# Patient Record
Sex: MALE | Employment: FULL TIME | URBAN - METROPOLITAN AREA
[De-identification: names, ages, dates, MRNs, and addresses within clinical notes are randomized per-mention and may not be internally consistent; named-entity substitution may affect disease eponyms.]

---

## 2017-02-02 ENCOUNTER — OFFICE VISIT (OUTPATIENT)
Dept: FAMILY MEDICINE | Facility: CLINIC | Age: 32
End: 2017-02-02

## 2017-02-02 VITALS
OXYGEN SATURATION: 100 % | DIASTOLIC BLOOD PRESSURE: 83 MMHG | RESPIRATION RATE: 18 BRPM | SYSTOLIC BLOOD PRESSURE: 122 MMHG | HEIGHT: 66 IN | WEIGHT: 169.6 LBS | TEMPERATURE: 97.5 F | BODY MASS INDEX: 27.26 KG/M2 | HEART RATE: 72 BPM

## 2017-02-02 DIAGNOSIS — Z00.00 ROUTINE GENERAL MEDICAL EXAMINATION AT A HEALTH CARE FACILITY: Primary | ICD-10-CM

## 2017-02-02 DIAGNOSIS — B35.0 TINEA CAPITIS: ICD-10-CM

## 2017-02-02 LAB
ALBUMIN SERPL-MCNC: 4.2 MG/DL (ref 3.8–5)
ALP SERPL-CCNC: 102.3 U/L (ref 31.7–110.7)
ALT SERPL-CCNC: 18.4 U/L (ref 0–45)
AST SERPL-CCNC: 17.1 U/L (ref 0–55)
BILIRUB SERPL-MCNC: 0.4 MG/DL (ref 0.2–1.3)
BILIRUBIN DIRECT: 0.2 MG/DL (ref 0.1–0.3)
PROT SERPL-MCNC: 7.5 G/DL (ref 6.8–8.8)

## 2017-02-02 RX ORDER — GRISEOFULVIN 250 MG/1
500 TABLET ORAL DAILY
Qty: 30 TABLET | Refills: 0 | Status: SHIPPED | OUTPATIENT
Start: 2017-02-02 | End: 2017-03-20
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
## 2017-02-02 NOTE — MR AVS SNAPSHOT
After Visit Summary   2/2/2017    Abhay Lopez    MRN: [id number]           Patient Information     Date Of Birth          1985        Visit Information        Provider Department      2/2/2017 10:40 AM Julius Mo DO [facility]        Today's Diagnoses     Routine general medical examination at a health care facility    -  1     Tinea capitis           Care Instructions      Preventive Health Recommendations  Male Ages 26 - 39    Yearly exam:             See your health care provider every year in order to  o   Review health changes.   o   Discuss preventive care.    o   Review your medicines if your doctor has prescribed any.    You should be tested each year for STDs (sexually transmitted diseases), if you re at risk.     After age 35, talk to your provider about cholesterol testing. If you are at risk for heart disease, have your cholesterol tested at least every 5 years.     If you are at risk for diabetes, you should have a diabetes test (fasting glucose).  Shots: Get a flu shot each year. Get a tetanus shot every 10 years.     Nutrition:    Eat at least 5 servings of fruits and vegetables daily.     Eat whole-grain bread, whole-wheat pasta and brown rice instead of white grains and rice.     Talk to your provider about Calcium and Vitamin D.     Lifestyle    Exercise for at least 150 minutes a week (30 minutes a day, 5 days a week). This will help you control your weight and prevent disease.     Limit alcohol to one drink per day.     No smoking.     Wear sunscreen to prevent skin cancer.     See your dentist every six months for an exam and cleaning.             Follow-ups after your visit        Who to contact     Please call your clinic at [phone] to:    Ask questions about your health    Make or cancel appointments    Discuss your medicines    Learn about your test results    Speak to your doctor   If you have compliments or concerns about an experience at  "your clinic, or if you wish to file a complaint, please contact [facility] Patient Relations at [phone] or email us at [email or web address]         Additional Information About Your Visit        AuthorBeesusan Information     [employer] is an electronic gateway that provides easy, online access to your medical records. With [employer], you can request a clinic appointment, read your test results, renew a prescription or communicate with your care team.     To sign up for [employer] visit the website at [email or web address]   You will be asked to enter the access code listed below, as well as some personal information. Please follow the directions to create your username and password.     Your access code is: [email or web address]  Expires: 5/3/2017 11:54 AM     Your access code will  in 90 days. If you need help or a new code, please contact your [facility] or call [phone] for assistance.        Care EveryWhere ID     This is your Care EveryWhere ID. This could be used by other organizations to access your [facility] medical records  [id number]        Your Vitals Were     Pulse Temperature Respirations Height BMI (Body Mass Index) Pulse Oximetry    72 97.5  F (36.4  C) (Oral) 18 5' 6.25\" (168.3 cm) 27.16 kg/m2 100%       Blood Pressure from Last 3 Encounters:   17 122/83   01/28/15 116/75   14 111/73    Weight from Last 3 Encounters:   17 169 lb 9.6 oz (76.93 kg)   01/28/15 164 lb 9.6 oz (74.662 kg)   14 163 lb 12.8 oz (74.299 kg)              We Performed the Following     Anti Treponema     Hepatic Panel ([facility])     HIV Antigen Antibody Combo          Today's Medication Changes          These changes are accurate as of: 17 11:54 AM.  If you have any questions, ask your nurse or doctor.               Start taking these medicines.        Dose/Directions    griseofulvin microsize 500 MG Tabs tablet   Commonly known as:  " GRIFULVIN V   Used for:  Tinea capitis   Started by:  Julius Mo DO        Dose:  500 mg   Take 1 tablet (500 mg) by mouth daily   Quantity:  30 tablet   Refills:  0            Where to get your medicines      These medications were sent to [facility] [place], [place] - [address] [facility] [address]     Phone:  [phone]    - griseofulvin microsize 500 MG Tabs tablet             Primary Care Provider Office Phone # Fax #    Adelina Villalpando [phone]       [facility] [address]   [facility] [address]        Thank you!     Thank you for choosing [facility]  for your care. Our goal is always to provide you with excellent care. Hearing back from our patients is one way we can continue to improve our services. Please take a few minutes to complete the written survey that you may receive in the mail after your visit with us. Thank you!             Your Updated Medication List - Protect others around you: Learn how to safely use, store and throw away your medicines at www.disposemymeds.org.          This list is accurate as of: 2/2/17 11:54 AM.  Always use your most recent med list.                   Brand Name Dispense Instructions for use    * cyclobenzaprine 10 MG tablet    FLEXERIL    30 tablet    Take 1 tablet (10 mg) by mouth At Bedtime       * cyclobenzaprine 5 MG tablet    FLEXERIL    42 tablet    Take 1 tablet (5 mg) by mouth 3 times daily as needed for muscle spasms       GABAPENTIN ([facility]) PO          griseofulvin microsize 500 MG Tabs tablet    GRIFULVIN V    30 tablet    Take 1 tablet (500 mg) by mouth daily       ibuprofen 600 MG tablet    ADVIL/MOTRIN    90 tablet    Take 1 tablet (600 mg) by mouth every 6 hours as needed for moderate pain       naproxen 500 MG tablet    NAPROSYN    60 tablet    Take 1 tablet (500 mg) by mouth 2 times daily as needed for moderate pain       * Notice:  This list has 2  medication(s) that are the same as other medications prescribed for you. Read the directions carefully, and ask your doctor or other care provider to review them with you.

## 2017-02-03 LAB
HIV 1+2 AB+HIV1 P24 AG SERPL QL IA: NORMAL
T PALLIDUM IGG+IGM SER QL: NEGATIVE

## 2017-02-06 NOTE — PROGRESS NOTES
Preceptor Attestation:   Patient seen and discussed with the resident. Assessment and plan reviewed with resident and agreed upon.   Supervising Physician:  Abraham Montes MD MD  [facility]

## 2017-02-15 ENCOUNTER — OFFICE VISIT (OUTPATIENT)
Dept: FAMILY MEDICINE | Facility: CLINIC | Age: 32
End: 2017-02-15

## 2017-02-15 VITALS
TEMPERATURE: 97.3 F | SYSTOLIC BLOOD PRESSURE: 126 MMHG | HEART RATE: 64 BPM | WEIGHT: 168.4 LBS | BODY MASS INDEX: 27.06 KG/M2 | HEIGHT: 66 IN | DIASTOLIC BLOOD PRESSURE: 88 MMHG

## 2017-02-15 DIAGNOSIS — S29.019A STRAIN OF MUSCLE OF TORSO, INITIAL ENCOUNTER: Primary | ICD-10-CM

## 2017-02-15 DIAGNOSIS — M51.369 L4-L5 DISC BULGE: ICD-10-CM
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
## 2017-02-15 PROBLEM — M54.41 RIGHT-SIDED LOW BACK PAIN WITH RIGHT-SIDED SCIATICA: Status: ACTIVE | Noted: 2017-02-15

## 2017-02-15 LAB
BILIRUBIN UR: ABNORMAL
BLOOD UR: NEGATIVE
GLUCOSE URINE: NEGATIVE
KETONES UR QL: ABNORMAL
LEUKOCYTE ESTERASE UR: NEGATIVE
NITRITE UR QL STRIP: NEGATIVE
PH UR STRIP: 5.5 [PH] (ref 5–7)
PROTEIN UR: NEGATIVE
SP GR UR STRIP: 1.02
UROBILINOGEN UR STRIP-ACNC: ABNORMAL

## 2017-02-15 RX ORDER — NAPROXEN 500 MG/1
500 TABLET ORAL 2 TIMES DAILY
Qty: 10 TABLET | Refills: 0 | Status: SHIPPED
Start: 2017-02-15 | End: 2017-02-20
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
## 2017-02-15 NOTE — NURSING NOTE
I have recommended that this patient have a flu shot but he declines at this time.Ana Riddle, CMA

## 2017-02-15 NOTE — MR AVS SNAPSHOT
After Visit Summary   2/15/2017    Abhay Lopez    MRN: [id number]           Patient Information     Date Of Birth          1985        Visit Information        Provider Department      2/15/2017 10:00 AM La Villalpando MD [facility]        Today's Diagnoses     Low back pain with sciatica, sciatica laterality unspecified, unspecified back pain laterality, unspecified chronicity    -  1    Right-sided low back pain with right-sided sciatica, unspecified chronicity          Care Instructions    Here is the plan from today's visit    1. Low back pain with sciatica, sciatica laterality unspecified, unspecified back pain laterality, unspecified chronicity  - Urinalysis, Micro If (UA) ([facility]) to rule out infection    2. Right-sided low back pain with right-sided sciatica, unspecified chronicity    - naproxen (NAPROSYN) 500 MG tablet; Take 1 tablet (500 mg) by mouth 2 times daily for 5 days  Dispense: 10 tablet; Refill: 0  - [facility], PT, HAND AND CHIROPRACTIC REFERRAL - [facility]    Please call or return to clinic if your symptoms don't go away.    Follow up plan  Please make a clinic appointment for follow up with me (LA VILLALPANDO) in 1-3  months for back pain follow up.    Thank you for coming to [facility] today.  Lab Testing:  **If you had lab testing today and your results are reassuring or normal they will be mailed to you or sent through Mono[employer] within 7 days.   **If the lab tests need quick action we will call you with the results.  The phone number we will call with results is # [phone] (home) . If this is not the best number please call our clinic and change the number.  Medication Refills:  If you need any refills please call your pharmacy and they will contact us.   If you need to  your refill at a new pharmacy, please contact the new pharmacy directly. The new pharmacy will help you get your medications transferred faster.   Scheduling:  If you have  "any concerns about today's visit or wish to schedule another appointment please call our office during normal business hours [phone] (8-5:00 M-F)  If a referral was made to a [facility] and you don't get a call from central scheduling please call [phone].  If a Mammogram was ordered for you at [facility] call [phone] to schedule or change your appointment.  If you had an XRay/CT/Ultrasound/MRI ordered the number is [phone] to schedule or change your radiology appointment.   Medical Concerns:  If you have urgent medical concerns please call [phone] at any time of the day.    Neck/Back Pain: General    Both neck and back pain are usually caused by injury to the muscles or ligaments of the spine. Sometimes the disks that separate each bone of the spine may cause pain by pressing on a nearby nerve. Back and neck pain may appear after a sudden twisting/bending force (such as in a car accident), or sometimes after a simple awkward movement. In either case, muscle spasm is often present and adds to the pain.  Acute neck and back pain usually gets better in 1 to 2 weeks. Pain related to disk disease, arthritis in the spinal joints or spinal stenosis (narrowing of the spinal canal) can become chronic and last for months or years.  Back and neck pain are common problems. Most people feel better in 1 or 2 weeks, and most of the rest in 1 to 2 months. Most people can remain active.  Symptoms  People experience and describe pain differently.    Pain can be sharp, stabbing, shooting, aching, cramping, or burning    Movement, standing, bending, lifting, sitting, or walking may worsen the pain    Pain can be localized to one spot or area, or it can be more generalized    Pain can spread or radiate upwards, downwards, to the front, or go down your arms    Muscle spasm may occur.  Cause  Most of the time \"mechanical problems\" with the muscles or spine cause the pain. it is " usually caused by an injury, whether known or not, to the muscles or ligaments. While illnesses can cause back pain, it is usually not caused by a serious illness. Pain is usually related to physical activity, whether sports, exercise, work, or normal activity. Sometimes it can occur without an identifiable cause. This can happen simply by stretching or moving wrong, without noting pain at the time. Other causes include:    Overexertion, lifting, pushing, pulling incorrectly or too aggressively.    Sudden twisting, bending or stretching from an accident (car or fall), or accidental movement.    Poor posture    Poor conditioning, lack of regular exercise    Spinal disc disease or arthritis    Stress    Pregnancy, or illness like appendicitis, bladder or kidney infection, pelvic infections   Home care    For neck pain: Use a comfortable pillow that supports the head and keeps the spine in a neutral position. The position of the head should not be tilted forward or backward.    When in bed, try to find a position of comfort. A firm mattress is best. Try lying flat on your back with pillows under your knees. You can also try lying on your side with your knees bent up towards your chest and a pillow between your knees.    At first, do not try to stretch out the sore spots. If there is a strain, it is not like the good soreness you get after exercising without an injury. In this case, stretching may make it worse.    Avoid prolonged sitting, long car rides or travel. This puts more stress on the lower back than standing or walking.    During the first 24 to 72 hours after an injury, apply an ice pack to the painful area for 20 minutes and then remove it for 20 minutes over a period of 60 to 90 minutes or several times a day. As a safety precaution, do not use a heating pad at bedtime. Sleeping with a heating pad can lead to skin burns or tissue damage.    Ice and heat therapies can be alternated. Talk with your health  care provider about the best treatment for your back or neck pain.    Therapeutic massage can help relax the back and neck muscles without stretching them.    Be aware of safe lifting methods and do not lift anything over 15 pounds until all the pain is gone.  Medications  Talk to your health care provider before using medications, especially if you have other medical problems or are taking other medicines.    You may use acetaminophen (such as Tylenol) or ibuprofen (such as Advil or Motrin) to control pain, unless another pain medicine was prescribed. If you have chronic conditions like diabetes, liver or kidney disease, stomach ulcers,  gastrointestinal bleeding, or are taking blood thinner medications.    Be careful if you are given pain medicines, narcotics, or medication for muscle spasm. They can cause drowsiness, and can affect your coordination, reflexes, and judgment. Do not drive or operate heavy machinery.  Follow-up care  Follow up with your health care provider if your symptoms do not start to improve after one week. Physical therapy or further tests may be needed.  If X-rays were taken, they will be reviewed by a radiologist. You will be notified of any new findings that may affect your care.  Call 911  Seek emergency medical care if any of the following occur:    Trouble breathing    Confusion    Very drowsy or trouble awakening    Fainting or loss of consciousness    Rapid or very slow heart rate    Loss of bowel or bladder control  When to seek medical care  Get prompt medical attention if any of the following occur:    Pain becomes worse or spreads into your arms or legs    Weakness, numbness or pain in one or both arms or legs    Numbness in the groin area    Difficulty walking    Fever of 100.4 F (38 C) or higher, or as directed by your healthcare provider    4970-6790 [employer]. [address] [place] [address], [place] [address]. All rights reserved. This information is not intended as a  substitute for professional medical care. Always follow your healthcare professional's instructions.              Follow-ups after your visit        Additional Services     [facility], PT, HAND AND CHIROPRACTIC REFERRAL - [facility]       **This order will print in the [facility] Scheduling Office**    Physical Therapy, Hand Therapy and Chiropractic Care are available through:    *[facility]  *[facility]  *[facility]    Call one number to schedule at any of the above locations: [phone].    Your provider has referred you to: As Indicated  Indication/Reason for Referral: R low back pain    Onset of Illness: 2 weeks  Therapy Orders: Evaluate and Treat        Please be aware that coverage of these services is subject to the terms and limitations of your health insurance plan.  Call member services at your health plan with any benefit or coverage questions.      Please bring the following to your appointment:    *Your personal calendar for scheduling future appointments  *Comfortable clothing                  Who to contact     Please call your clinic at [phone] to:    Ask questions about your health    Make or cancel appointments    Discuss your medicines    Learn about your test results    Speak to your doctor   If you have compliments or concerns about an experience at your clinic, or if you wish to file a complaint, please contact [facility] Patient Relations at [phone] or email us at [email or web address]         Additional Information About Your Visit        [employer] Information     [employer] is an electronic gateway that provides easy, online access to your medical records. With [employer], you can request a clinic appointment, read your test results, renew a prescription or communicate with your care team.     To sign up for dscoveredt visit the website at [email or web address]   You will be asked to enter the access code  "listed below, as well as some personal information. Please follow the directions to create your username and password.     Your access code is: [email or web address]  Expires: 5/3/2017 11:54 AM     Your access code will  in 90 days. If you need help or a new code, please contact your [facility] or call [phone] for assistance.        Care EveryWhere ID     This is your Care EveryWhere ID. This could be used by other organizations to access your [facility] medical records  [id number]        Your Vitals Were     Pulse Temperature Height BMI (Body Mass Index)          64 97.3  F (36.3  C) (Oral) 5' 6.25\" (168.3 cm) 26.98 kg/m2         Blood Pressure from Last 3 Encounters:   02/15/17 126/88   17 122/83   01/28/15 116/75    Weight from Last 3 Encounters:   02/15/17 168 lb 6.4 oz (76.4 kg)   17 169 lb 9.6 oz (76.9 kg)   01/28/15 164 lb 9.6 oz (74.7 kg)              We Performed the Following     [facility], PT, HAND AND CHIROPRACTIC REFERRAL - [facility]     Urinalysis, Micro If (UA) ([facility])          Today's Medication Changes          These changes are accurate as of: 2/15/17 10:44 AM.  If you have any questions, ask your nurse or doctor.               These medicines have changed or have updated prescriptions.        Dose/Directions    naproxen 500 MG tablet   Commonly known as:  NAPROSYN   This may have changed:    - when to take this  - reasons to take this   Used for:  Right-sided low back pain with right-sided sciatica, unspecified chronicity   Changed by:  Adelina Villalpando MD        Dose:  500 mg   Take 1 tablet (500 mg) by mouth 2 times daily for 5 days   Quantity:  10 tablet   Refills:  0         Stop taking these medicines if you haven't already. Please contact your care team if you have questions.     cyclobenzaprine 10 MG tablet   Commonly known as:  FLEXERIL   Stopped by:  Adelina Villalpando MD           cyclobenzaprine 5 MG tablet   Commonly known as:  FLEXERIL "   Stopped by:  Adelina Villalpando MD           GABAPENTIN (PHN) PO   Stopped by:  Adelina Villalpando MD           ibuprofen 600 MG tablet   Commonly known as:  ADVIL/MOTRIN   Stopped by:  Adelina Villalpando MD                Where to get your medicines      These medications were sent to [facility] [place], [place] - [address], [facility] [address]     Phone:  [phone]     naproxen 500 MG tablet                Primary Care Provider Office Phone # Fax #    Adelina Vlilalpando [phone]       [facility] [address]   [facility] [address]        Thank you!     Thank you for choosing [facility]  for your care. Our goal is always to provide you with excellent care. Hearing back from our patients is one way we can continue to improve our services. Please take a few minutes to complete the written survey that you may receive in the mail after your visit with us. Thank you!             Your Updated Medication List - Protect others around you: Learn how to safely use, store and throw away your medicines at www.disposemymeds.org.          This list is accurate as of: 2/15/17 10:44 AM.  Always use your most recent med list.                   Brand Name Dispense Instructions for use    griseofulvin microsize 500 MG Tabs tablet    GRIFULVIN V    30 tablet    Take 1 tablet (500 mg) by mouth daily       naproxen 500 MG tablet    NAPROSYN    10 tablet    Take 1 tablet (500 mg) by mouth 2 times daily for 5 days

## 2017-02-15 NOTE — PATIENT INSTRUCTIONS
Here is the plan from today's visit    1. Low back pain with sciatica, sciatica laterality unspecified, unspecified back pain laterality, unspecified chronicity  - Urinalysis, Micro If (UA) ([facility]) to rule out infection    2. Right-sided low back pain with right-sided sciatica, unspecified chronicity    - naproxen (NAPROSYN) 500 MG tablet; Take 1 tablet (500 mg) by mouth 2 times daily for 5 days  Dispense: 10 tablet; Refill: 0  - [facility], PT, HAND AND CHIROPRACTIC REFERRAL - [facility]    Please call or return to clinic if your symptoms don't go away.    Follow up plan  Please make a clinic appointment for follow up with me (LA PATRICK) in 1-3  months for back pain follow up.    Thank you for coming to [facility] today.  Lab Testing:  **If you had lab testing today and your results are reassuring or normal they will be mailed to you or sent through Manta within 7 days.   **If the lab tests need quick action we will call you with the results.  The phone number we will call with results is # [phone] (home) . If this is not the best number please call our clinic and change the number.  Medication Refills:  If you need any refills please call your pharmacy and they will contact us.   If you need to  your refill at a new pharmacy, please contact the new pharmacy directly. The new pharmacy will help you get your medications transferred faster.   Scheduling:  If you have any concerns about today's visit or wish to schedule another appointment please call our office during normal business hours [phone] (8-5:00 M-F)  If a referral was made to a [facility] and you don't get a call from central scheduling please call [phone].  If a Mammogram was ordered for you at [facility] call [phone] to schedule or change your appointment.  If you had an XRay/CT/Ultrasound/MRI ordered the number is [phone] to schedule or change your radiology appointment.   Medical  "Concerns:  If you have urgent medical concerns please call [phone] at any time of the day.    Neck/Back Pain: General    Both neck and back pain are usually caused by injury to the muscles or ligaments of the spine. Sometimes the disks that separate each bone of the spine may cause pain by pressing on a nearby nerve. Back and neck pain may appear after a sudden twisting/bending force (such as in a car accident), or sometimes after a simple awkward movement. In either case, muscle spasm is often present and adds to the pain.  Acute neck and back pain usually gets better in 1 to 2 weeks. Pain related to disk disease, arthritis in the spinal joints or spinal stenosis (narrowing of the spinal canal) can become chronic and last for months or years.  Back and neck pain are common problems. Most people feel better in 1 or 2 weeks, and most of the rest in 1 to 2 months. Most people can remain active.  Symptoms  People experience and describe pain differently.    Pain can be sharp, stabbing, shooting, aching, cramping, or burning    Movement, standing, bending, lifting, sitting, or walking may worsen the pain    Pain can be localized to one spot or area, or it can be more generalized    Pain can spread or radiate upwards, downwards, to the front, or go down your arms    Muscle spasm may occur.  Cause  Most of the time \"mechanical problems\" with the muscles or spine cause the pain. it is usually caused by an injury, whether known or not, to the muscles or ligaments. While illnesses can cause back pain, it is usually not caused by a serious illness. Pain is usually related to physical activity, whether sports, exercise, work, or normal activity. Sometimes it can occur without an identifiable cause. This can happen simply by stretching or moving wrong, without noting pain at the time. Other causes include:    Overexertion, lifting, pushing, pulling incorrectly or too aggressively.    Sudden twisting, bending or stretching " from an accident (car or fall), or accidental movement.    Poor posture    Poor conditioning, lack of regular exercise    Spinal disc disease or arthritis    Stress    Pregnancy, or illness like appendicitis, bladder or kidney infection, pelvic infections   Home care    For neck pain: Use a comfortable pillow that supports the head and keeps the spine in a neutral position. The position of the head should not be tilted forward or backward.    When in bed, try to find a position of comfort. A firm mattress is best. Try lying flat on your back with pillows under your knees. You can also try lying on your side with your knees bent up towards your chest and a pillow between your knees.    At first, do not try to stretch out the sore spots. If there is a strain, it is not like the good soreness you get after exercising without an injury. In this case, stretching may make it worse.    Avoid prolonged sitting, long car rides or travel. This puts more stress on the lower back than standing or walking.    During the first 24 to 72 hours after an injury, apply an ice pack to the painful area for 20 minutes and then remove it for 20 minutes over a period of 60 to 90 minutes or several times a day. As a safety precaution, do not use a heating pad at bedtime. Sleeping with a heating pad can lead to skin burns or tissue damage.    Ice and heat therapies can be alternated. Talk with your health care provider about the best treatment for your back or neck pain.    Therapeutic massage can help relax the back and neck muscles without stretching them.    Be aware of safe lifting methods and do not lift anything over 15 pounds until all the pain is gone.  Medications  Talk to your health care provider before using medications, especially if you have other medical problems or are taking other medicines.    You may use acetaminophen (such as Tylenol) or ibuprofen (such as Advil or Motrin) to control pain, unless another pain medicine was  prescribed. If you have chronic conditions like diabetes, liver or kidney disease, stomach ulcers,  gastrointestinal bleeding, or are taking blood thinner medications.    Be careful if you are given pain medicines, narcotics, or medication for muscle spasm. They can cause drowsiness, and can affect your coordination, reflexes, and judgment. Do not drive or operate heavy machinery.  Follow-up care  Follow up with your health care provider if your symptoms do not start to improve after one week. Physical therapy or further tests may be needed.  If X-rays were taken, they will be reviewed by a radiologist. You will be notified of any new findings that may affect your care.  Call 911  Seek emergency medical care if any of the following occur:    Trouble breathing    Confusion    Very drowsy or trouble awakening    Fainting or loss of consciousness    Rapid or very slow heart rate    Loss of bowel or bladder control  When to seek medical care  Get prompt medical attention if any of the following occur:    Pain becomes worse or spreads into your arms or legs    Weakness, numbness or pain in one or both arms or legs    Numbness in the groin area    Difficulty walking    Fever of 100.4 F (38 C) or higher, or as directed by your healthcare provider    4695-2595 [employer]. [address] [place] [address], [place] [address]. All rights reserved. This information is not intended as a substitute for professional medical care. Always follow your healthcare professional's instructions.

## 2017-02-16 PROBLEM — M51.369 L4-L5 DISC BULGE: Status: ACTIVE | Noted: 2017-02-16

## 2017-02-16 ASSESSMENT — ENCOUNTER SYMPTOMS
NECK PAIN: 0
MYALGIAS: 0
FATIGUE: 0
NECK STIFFNESS: 0
CHILLS: 0
ACTIVITY CHANGE: 1
ARTHRALGIAS: 0
FEVER: 0
BACK PAIN: 1

## 2017-03-15 ENCOUNTER — OFFICE VISIT (OUTPATIENT)
Dept: FAMILY MEDICINE | Facility: CLINIC | Age: 32
End: 2017-03-15

## 2017-03-15 VITALS
TEMPERATURE: 98 F | BODY MASS INDEX: 27.03 KG/M2 | HEIGHT: 66 IN | RESPIRATION RATE: 16 BRPM | HEART RATE: 71 BPM | OXYGEN SATURATION: 99 % | WEIGHT: 168.2 LBS | DIASTOLIC BLOOD PRESSURE: 81 MMHG | SYSTOLIC BLOOD PRESSURE: 120 MMHG

## 2017-03-15 DIAGNOSIS — L98.9 SKIN LESION OF SCALP: Primary | ICD-10-CM

## 2017-03-15 LAB
KOH PREP: POSITIVE
SPECIMEN DESCRIPTION: NORMAL

## 2017-03-15 RX ORDER — KETOCONAZOLE 20 MG/ML
SHAMPOO TOPICAL DAILY PRN
Qty: 120 ML | Refills: 1 | Status: SHIPPED
Start: 2017-03-15 | End: 2018-09-19

## 2017-03-15 RX ORDER — CEPHALEXIN 500 MG/1
500 CAPSULE ORAL 4 TIMES DAILY
Qty: 40 CAPSULE | Refills: 0 | Status: SHIPPED
Start: 2017-03-15 | End: 2017-11-21

## 2017-03-15 NOTE — PROGRESS NOTES
Preceptor Attestation:   Patient seen and discussed with the resident. Assessment and plan reviewed with resident and agreed upon.   Supervising Physician:  [facility]

## 2017-03-15 NOTE — MR AVS SNAPSHOT
After Visit Summary   3/15/2017    Abhay Lopez    MRN: [id number]           Patient Information     Date Of Birth          1985        Visit Information        Provider Department      3/15/2017 11:20 AM Ely Devine MD [facility]        Today's Diagnoses     Skin lesion of scalp    -  1      Care Instructions    Start Cephalexin 4 x a day   Eat Yogurt to prevent diarrhea   Start using shampoo, leave on hair for 5 min then wash   See you back in 2 weeks         Follow-ups after your visit        Who to contact     Please call your clinic at [phone] to:    Ask questions about your health    Make or cancel appointments    Discuss your medicines    Learn about your test results    Speak to your doctor   If you have compliments or concerns about an experience at your clinic, or if you wish to file a complaint, please contact [facility] Patient Relations at [phone] or email us at [email or web address]         Additional Information About Your Visit        MyChart Information     [employer] is an electronic gateway that provides easy, online access to your medical records. With [employer], you can request a clinic appointment, read your test results, renew a prescription or communicate with your care team.     To sign up for Tap[employer]t visit the website at [email or web address]   You will be asked to enter the access code listed below, as well as some personal information. Please follow the directions to create your username and password.     Your access code is: [email or web address]  Expires: 5/3/2017 12:54 PM     Your access code will  in 90 days. If you need help or a new code, please contact your [facility] or call [phone] for assistance.        Care EveryWhere ID     This is your Care EveryWhere ID. This could be used by other organizations to access your [facility] medical records  [id number]       "  Your Vitals Were     Pulse Temperature Respirations Height Pulse Oximetry BMI (Body Mass Index)    71 98  F (36.7  C) (Oral) 16 5' 6.25\" (168.3 cm) 99% 26.94 kg/m2       Blood Pressure from Last 3 Encounters:   03/15/17 120/81   02/15/17 126/88   02/02/17 122/83    Weight from Last 3 Encounters:   03/15/17 168 lb 3.2 oz (76.3 kg)   02/15/17 168 lb 6.4 oz (76.4 kg)   02/02/17 169 lb 9.6 oz (76.9 kg)              We Performed the Following     KOH Prep ([facility])          Today's Medication Changes          These changes are accurate as of: 3/15/17 12:16 PM.  If you have any questions, ask your nurse or doctor.               Start taking these medicines.        Dose/Directions    cephALEXin 500 MG capsule   Commonly known as:  KEFLEX   Used for:  Skin lesion of scalp   Started by:  Ely Devine MD        Dose:  500 mg   Take 1 capsule (500 mg) by mouth 4 times daily   Quantity:  40 capsule   Refills:  0       ketoconazole 2 % shampoo   Commonly known as:  NIZORAL   Used for:  Skin lesion of scalp   Started by:  Ely Devine MD        Apply topically daily as needed for itching or irritation   Quantity:  120 mL   Refills:  1            Where to get your medicines      These medications were sent to [facility] [place], [place] - [address] [place] [address]     Phone:  [phone]     cephALEXin 500 MG capsule    ketoconazole 2 % shampoo                Primary Care Provider Office Phone # Fax #    Adelina Villalpando [phone]       [facility] [address]   [facility] [address]        Thank you!     Thank you for choosing [facility]  for your care. Our goal is always to provide you with excellent care. Hearing back from our patients is one way we can continue to improve our services. Please take a few minutes to complete the written survey that you may receive in the mail after your visit with us. Thank you!      "        Your Updated Medication List - Protect others around you: Learn how to safely use, store and throw away your medicines at www.disposemymeds.org.          This list is accurate as of: 3/15/17 12:16 PM.  Always use your most recent med list.                   Brand Name Dispense Instructions for use    cephALEXin 500 MG capsule    KEFLEX    40 capsule    Take 1 capsule (500 mg) by mouth 4 times daily       griseofulvin microsize 500 MG Tabs tablet    GRIFULVIN V    30 tablet    Take 1 tablet (500 mg) by mouth daily       ketoconazole 2 % shampoo    NIZORAL    120 mL    Apply topically daily as needed for itching or irritation

## 2017-03-15 NOTE — PATIENT INSTRUCTIONS
Start Cephalexin 4 x a day   Eat Yogurt to prevent diarrhea   Start using shampoo, leave on hair for 5 min then wash   See you back in 2 weeks
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
## 2017-04-03 ENCOUNTER — TELEPHONE (OUTPATIENT)
Dept: FAMILY MEDICINE | Facility: CLINIC | Age: 32
End: 2017-04-03

## 2017-04-03 NOTE — TELEPHONE ENCOUNTER
"Patient has no showed 2 scheduled appointments. Please use following script to explain no show policy to patient:    \"We see that you have missed some of your recently scheduled appointments. At [facility] we have a new no show policy, where if you miss too many appointments within 1 year, we may not be able to continue to schedule you. If you are unable to make your scheduled appointments, please call the clinic to cancel prior to your appointment time.\"  "

## 2017-11-21 ENCOUNTER — OFFICE VISIT (OUTPATIENT)
Dept: INTERNAL MEDICINE | Facility: CLINIC | Age: 32
End: 2017-11-21
Payer: COMMERCIAL

## 2017-11-21 VITALS
WEIGHT: 166 LBS | TEMPERATURE: 98.1 F | SYSTOLIC BLOOD PRESSURE: 115 MMHG | BODY MASS INDEX: 26.59 KG/M2 | HEART RATE: 61 BPM | DIASTOLIC BLOOD PRESSURE: 77 MMHG | RESPIRATION RATE: 16 BRPM

## 2017-11-21 DIAGNOSIS — L98.9 SKIN LESION OF SCALP: ICD-10-CM

## 2017-11-21 PROCEDURE — 99213 OFFICE O/P EST LOW 20 MIN: CPT | Performed by: INTERNAL MEDICINE

## 2017-11-21 RX ORDER — TRIAMCINOLONE ACETONIDE 1 MG/G
OINTMENT TOPICAL
Qty: 15 G | Refills: 0 | Status: SHIPPED | OUTPATIENT
Start: 2017-11-21 | End: 2018-09-19

## 2017-11-21 RX ORDER — CEPHALEXIN 500 MG/1
500 CAPSULE ORAL 4 TIMES DAILY
Qty: 40 CAPSULE | Refills: 0 | Status: SHIPPED | OUTPATIENT
Start: 2017-11-21 | End: 2018-09-19

## 2017-11-21 RX ORDER — GRISEOFULVIN 250 MG/1
500 TABLET ORAL DAILY
Qty: 30 TABLET | Refills: 1 | Status: SHIPPED | OUTPATIENT
Start: 2017-11-21 | End: 2018-09-19

## 2017-11-21 NOTE — PATIENT INSTRUCTIONS
Take antibiotic as directed and use steroid ointment for first 10 days     Then switch to using other medication      Stop medications and call if worsening or new symptoms     Call if not improving after 4-6 weeks so we can get you to a dermatologist

## 2017-11-21 NOTE — NURSING NOTE
"Chief Complaint   Patient presents with     Mass     recheck on lump on head       Initial /77 (BP Location: Left arm, Patient Position: Sitting, Cuff Size: Adult Large)  Pulse 61  Temp 98.1  F (36.7  C) (Oral)  Resp 16  Wt 166 lb (75.3 kg)  BMI 26.59 kg/m2 Estimated body mass index is 26.59 kg/(m^2) as calculated from the following:    Height as of 3/15/17: 5' 6.25\" (1.683 m).    Weight as of this encounter: 166 lb (75.3 kg).  Medication Reconciliation: complete    "

## 2017-11-21 NOTE — PROGRESS NOTES
SUBJECTIVE:   Abhay Lopez is a 32 year old male who presents to clinic today for the following health issues:      Recheck on scalp mass/lump    Painful/sore on crown.  He reports antibiotic helped back in March and isn't sure if the Nizoral helped after that.  He's most worried about losing his hair.  Reports good health otherwise.  Was diagnosed as tinea capitis at [facility] clinic in March.  Had been treated with griseofulvin prior.      1. Skin lesion of scalp        PMH: Updated and/or reviewed in chart.      ROS:  Review of systems per HPI -- no  present and decent but certainly limited English skills, so some language barrier encountered in attempting more complete ROS.    OBJECTIVE:                                                    /77 (BP Location: Left arm, Patient Position: Sitting, Cuff Size: Adult Large)  Pulse 61  Temp 98.1  F (36.7  C) (Oral)  Resp 16  Wt 166 lb (75.3 kg)  BMI 26.59 kg/m2    GEN: No acute distress  SKIN: scalp with flat non-scaled raised plaque about 2 cm x 1.5 cm without significant alteration of healthy-appearing hair but likely excoriations.  Visible skin otherwise normal.   PSYCH: Normal mood and affect      ASSESSMENT/PLAN:                                                    1. Skin lesion of scalp  We elected not to repeat KOH today.  I agree there is some objective concern for superficial bacterial infection, so I think it's reasonable to repeat antibiotic course and attempt topical corticosteroid for a short time.  Empiric re-treatment for tinea if not significantly improving with steroid to follow; if not improved after 4-6 weeks, would prefer he have more definitive diagnosis and management through dermatology.  Patient agreed with plan and demonstrated understanding to contact us for help if not improving or sooner if worsening or if other questions or concerns arise.  - cephALEXin (KEFLEX) 500 MG capsule; Take 1 capsule (500 mg) by mouth 4  times daily  Dispense: 40 capsule; Refill: 0  - triamcinolone (KENALOG) 0.1 % ointment; Apply sparingly to affected area 2-3 times daily for 10 days.  Dispense: 15 g; Refill: 0  - griseofulvin microsize (GRIFULVIN V) 500 MG TABS tablet; Take 1 tablet (500 mg) by mouth daily  Dispense: 30 tablet; Refill: 1      Patient Instructions      Take antibiotic as directed and use steroid ointment for first 10 days     Then switch to using other medication      Stop medications and call if worsening or new symptoms     Call if not improving after 4-6 weeks so we can get you to a dermatologist       Orders Placed This Encounter     cephALEXin (KEFLEX) 500 MG capsule     triamcinolone (KENALOG) 0.1 % ointment     griseofulvin microsize (GRIFULVIN V) 500 MG TABS tablet              Alistair Penny MD

## 2017-11-21 NOTE — MR AVS SNAPSHOT
"              After Visit Summary   11/21/2017    Abhay Lopez    MRN: [id number]           Patient Information     Date Of Birth          1985        Visit Information        Provider Department      11/21/2017 4:00 PM Alistair Penny MD [facility]        Today's Diagnoses     Skin lesion of scalp          Care Instructions       Take antibiotic as directed and use steroid ointment for first 10 days     Then switch to using other medication      Stop medications and call if worsening or new symptoms     Call if not improving after 4-6 weeks so we can get you to a dermatologist            Follow-ups after your visit        Who to contact     Normal or non-critical lab and imaging results will be communicated to you by OpTierhart, letter or phone within 4 business days after the clinic has received the results. If you do not hear from us within 7 days, please contact the clinic through OpTierhart or phone. If you have a critical or abnormal lab result, we will notify you by phone as soon as possible.  Submit refill requests through "[employer]" or call your pharmacy and they will forward the refill request to us. Please allow 3 business days for your refill to be completed.          If you need to speak with a  for additional information , please call: [phone]             Additional Information About Your Visit        OpTierharMyCube Information     "[employer]" lets you send messages to your doctor, view your test results, renew your prescriptions, schedule appointments and more. To sign up, go to [email or web address]" . Click on \"Log in\" on the left side of the screen, which will take you to the Welcome page. Then click on \"Sign up Now\" on the right side of the page.     You will be asked to enter the access code listed below, as well as some personal information. Please follow the directions to create your username and password.     Your access code is: [email or web address]  Expires: 2/19/2018  4:40 PM   "   Your access code will  in 90 days. If you need help or a new code, please call your [facility] clinic or [phone].        Care EveryWhere ID     This is your Care EveryWhere ID. This could be used by other organizations to access your [facility] medical records  [id number]        Your Vitals Were     Pulse Temperature Respirations BMI (Body Mass Index)          61 98.1  F (36.7  C) (Oral) 16 26.59 kg/m2         Blood Pressure from Last 3 Encounters:   17 115/77   03/15/17 120/81   02/15/17 126/88    Weight from Last 3 Encounters:   17 166 lb (75.3 kg)   03/15/17 168 lb 3.2 oz (76.3 kg)   02/15/17 168 lb 6.4 oz (76.4 kg)              Today, you had the following     No orders found for display         Today's Medication Changes          These changes are accurate as of: 17  4:40 PM.  If you have any questions, ask your nurse or doctor.               Start taking these medicines.        Dose/Directions    griseofulvin microsize 500 MG Tabs tablet   Commonly known as:  GRIFULVIN V   Used for:  Skin lesion of scalp   Started by:  Alistair Penny MD        Dose:  500 mg   Take 1 tablet (500 mg) by mouth daily   Quantity:  30 tablet   Refills:  1       triamcinolone 0.1 % ointment   Commonly known as:  KENALOG   Used for:  Skin lesion of scalp   Started by:  Alistair Penny MD        Apply sparingly to affected area 2-3 times daily for 10 days.   Quantity:  15 g   Refills:  0            Where to get your medicines      These medications were sent to [facility] [address] [facility]  [address] [address] [address]     Phone:  [phone]     cephALEXin 500 MG capsule    griseofulvin microsize 500 MG Tabs tablet    triamcinolone 0.1 % ointment                Primary Care Provider Office Phone # Fax #    Adelina Villalpando [phone]       [facility] [address] [facility] [address]        Equal Access to Services      KALEB LIVINGSTON : Hadii aad ku hadfrancis Sojarrettali, waaxda luqadaha, qaybta kaalmada adechantelle, delphine megan tiancarlitos hartman miriantoya [place] . So [facility] [phone].    ATENCIÓN: Si habla español, tiene a bahena disposición servicios gratuitos de asistencia lingüística. Llame al [phone].    We comply with applicable federal civil rights laws and [place] laws. We do not discriminate on the basis of race, color, national origin, age, disability, sex, sexual orientation, or gender identity.            Thank you!     Thank you for choosing [facility]  for your care. Our goal is always to provide you with excellent care. Hearing back from our patients is one way we can continue to improve our services. Please take a few minutes to complete the written survey that you may receive in the mail after your visit with us. Thank you!             Your Updated Medication List - Protect others around you: Learn how to safely use, store and throw away your medicines at www.disposemymeds.org.          This list is accurate as of: 11/21/17  4:40 PM.  Always use your most recent med list.                   Brand Name Dispense Instructions for use Diagnosis    cephALEXin 500 MG capsule    KEFLEX    40 capsule    Take 1 capsule (500 mg) by mouth 4 times daily    Skin lesion of scalp       griseofulvin microsize 500 MG Tabs tablet    GRIFULVIN V    30 tablet    Take 1 tablet (500 mg) by mouth daily    Skin lesion of scalp       ketoconazole 2 % shampoo    NIZORAL    120 mL    Apply topically daily as needed for itching or irritation    Skin lesion of scalp       triamcinolone 0.1 % ointment    KENALOG    15 g    Apply sparingly to affected area 2-3 times daily for 10 days.    Skin lesion of scalp

## 2018-09-19 ENCOUNTER — OFFICE VISIT (OUTPATIENT)
Dept: INTERNAL MEDICINE | Facility: CLINIC | Age: 33
End: 2018-09-19
Payer: COMMERCIAL

## 2018-09-19 VITALS
HEART RATE: 62 BPM | WEIGHT: 171 LBS | SYSTOLIC BLOOD PRESSURE: 115 MMHG | RESPIRATION RATE: 16 BRPM | BODY MASS INDEX: 27.39 KG/M2 | TEMPERATURE: 97 F | DIASTOLIC BLOOD PRESSURE: 79 MMHG

## 2018-09-19 DIAGNOSIS — L98.9 SKIN LESION OF SCALP: ICD-10-CM

## 2018-09-19 PROCEDURE — 99214 OFFICE O/P EST MOD 30 MIN: CPT | Performed by: INTERNAL MEDICINE

## 2018-09-19 RX ORDER — KETOCONAZOLE 20 MG/ML
SHAMPOO TOPICAL DAILY PRN
Qty: 120 ML | Refills: 1 | Status: SHIPPED | OUTPATIENT
Start: 2018-09-19 | End: 2020-05-14 | Stop reason: ALTCHOICE

## 2018-09-19 RX ORDER — GRISEOFULVIN 250 MG/1
500 TABLET ORAL DAILY
Qty: 30 TABLET | Refills: 1 | Status: SHIPPED | OUTPATIENT
Start: 2018-09-19 | End: 2020-05-14 | Stop reason: ALTCHOICE

## 2018-09-19 RX ORDER — TRIAMCINOLONE ACETONIDE 1 MG/G
OINTMENT TOPICAL
Qty: 15 G | Refills: 0 | Status: SHIPPED | OUTPATIENT
Start: 2018-09-19 | End: 2020-05-14 | Stop reason: ALTCHOICE

## 2018-09-19 RX ORDER — KETOCONAZOLE 20 MG/G
CREAM TOPICAL 2 TIMES DAILY
Qty: 15 G | Refills: 1 | Status: SHIPPED | OUTPATIENT
Start: 2018-09-19 | End: 2020-05-14 | Stop reason: ALTCHOICE

## 2018-09-19 NOTE — MR AVS SNAPSHOT
After Visit Summary   9/19/2018    Abhay Lopez    MRN: [id number]           Patient Information     Date Of Birth          1985        Visit Information        Provider Department      9/19/2018 4:30 PM Alistair Penny MD [facility]        Today's Diagnoses     Skin lesion of scalp           Follow-ups after your visit        Additional Services     DERMATOLOGY REFERRAL       Your provider has referred you to: [facility]: Dermatology [facility] [phone]   [email or web address]  [facility]: [facility] [phone]   [email or web address]    Please be aware that coverage of these services is subject to the terms and limitations of your health insurance plan.  Call member services at your health plan with any benefit or coverage questions.      Please bring the following with you to your appointment:    (1) Any X-Rays, CTs or MRIs which have been performed.  Contact the facility where they were done to arrange for  prior to your scheduled appointment.    (2) List of current medications  (3) This referral request   (4) Any documents/labs given to you for this referral                  Follow-up notes from your care team     Return if symptoms worsen or fail to improve.      Who to contact     Normal or non-critical lab and imaging results will be communicated to you by MyChart, letter or phone within 4 business days after the clinic has received the results. If you do not hear from us within 7 days, please contact the clinic through MyChart or phone. If you have a critical or abnormal lab result, we will notify you by phone as soon as possible.  Submit refill requests through Et[employer] or call your pharmacy and they will forward the refill request to us. Please allow 3 business days for your refill to be completed.          If you need to speak with a   "for additional information , please call: [phone]             Additional Information About Your Visit        Nearpodhart Information     Near[employer]hart lets you send messages to your doctor, view your test results, renew your prescriptions, schedule appointments and more. To sign up, go to [email or web address] . Click on \"Log in\" on the left side of the screen, which will take you to the Welcome page. Then click on \"Sign up Now\" on the right side of the page.     You will be asked to enter the access code listed below, as well as some personal information. Please follow the directions to create your username and password.     Your access code is: [email or web address]  Expires: 2018  5:05 PM     Your access code will  in 90 days. If you need help or a new code, please call your [facility] clinic or [phone].        Care EveryWhere ID     This is your [facility] EveryWhere ID. This could be used by other organizations to access your [facility] medical records  [id number]        Your Vitals Were     Pulse Temperature Respirations BMI (Body Mass Index)          62 97  F (36.1  C) (Tympanic) 16 27.39 kg/m2         Blood Pressure from Last 3 Encounters:   18 115/79   17 115/77   03/15/17 120/81    Weight from Last 3 Encounters:   18 171 lb (77.6 kg)   17 166 lb (75.3 kg)   03/15/17 168 lb 3.2 oz (76.3 kg)              We Performed the Following     DERMATOLOGY REFERRAL          Today's Medication Changes          These changes are accurate as of 18  5:05 PM.  If you have any questions, ask your nurse or doctor.               These medicines have changed or have updated prescriptions.        Dose/Directions    * ketoconazole 2 % shampoo   Commonly known as:  NIZORAL   This may have changed:  Another medication with the same name was added. Make sure you understand how and when to take each.   Used for:  Skin lesion of scalp   Changed by:  Alistair Penny MD        Apply topically daily as needed for " itching or irritation   Quantity:  120 mL   Refills:  1       * ketoconazole 2 % cream   Commonly known as:  NIZORAL   This may have changed:  You were already taking a medication with the same name, and this prescription was added. Make sure you understand how and when to take each.   Used for:  Skin lesion of scalp   Changed by:  Alistair Penny MD        Apply topically 2 times daily   Quantity:  15 g   Refills:  1       * Notice:  This list has 2 medication(s) that are the same as other medications prescribed for you. Read the directions carefully, and ask your doctor or other care provider to review them with you.      Stop taking these medicines if you haven't already. Please contact your care team if you have questions.     cephALEXin 500 MG capsule   Commonly known as:  KEFLEX   Stopped by:  Alistair Penny MD                Where to get your medicines      These medications were sent to [facility] [place]  [place] - [address] [facility]  [address] [facility] [address]     Phone:  [phone]     griseofulvin microsize 500 MG Tabs tablet    ketoconazole 2 % cream    ketoconazole 2 % shampoo    triamcinolone 0.1 % ointment                Primary Care Provider Office Phone # Fax #    Kadie Lorenzo [phone]       [address] [address]        Equal Access to Services     JAMEEL LIVINGSTON AH: Hadii nestor ku hadasho Soomaali, waaxda luqadaha, qaybta kaalmada adeegyada, waxay idiin hayaan minnie murray. So [facility] [phone].    ATENCIÓN: Si habla español, tiene a bahena disposición servicios gratuitos de asistencia lingüística. Remigio al [phone].    We comply with applicable federal civil rights laws and [place] laws. We do not discriminate on the basis of race, color, national origin, age, disability, sex, sexual orientation, or gender identity.            Thank you!     Thank you for choosing [facility]  for your care. Our  goal is always to provide you with excellent care. Hearing back from our patients is one way we can continue to improve our services. Please take a few minutes to complete the written survey that you may receive in the mail after your visit with us. Thank you!             Your Updated Medication List - Protect others around you: Learn how to safely use, store and throw away your medicines at www.disposemymeds.org.          This list is accurate as of 9/19/18  5:05 PM.  Always use your most recent med list.                   Brand Name Dispense Instructions for use Diagnosis    griseofulvin microsize 500 MG Tabs tablet    GRIFULVIN V    30 tablet    Take 1 tablet (500 mg) by mouth daily    Skin lesion of scalp       * ketoconazole 2 % shampoo    NIZORAL    120 mL    Apply topically daily as needed for itching or irritation    Skin lesion of scalp       * ketoconazole 2 % cream    NIZORAL    15 g    Apply topically 2 times daily    Skin lesion of scalp       triamcinolone 0.1 % ointment    KENALOG    15 g    Apply sparingly to affected area 2-3 times daily for 10 days.    Skin lesion of scalp       * Notice:  This list has 2 medication(s) that are the same as other medications prescribed for you. Read the directions carefully, and ask your doctor or other care provider to review them with you.

## 2019-07-18 ENCOUNTER — OFFICE VISIT (OUTPATIENT)
Dept: FAMILY MEDICINE | Facility: CLINIC | Age: 34
End: 2019-07-18
Payer: COMMERCIAL

## 2019-07-18 VITALS
OXYGEN SATURATION: 99 % | TEMPERATURE: 98.6 F | DIASTOLIC BLOOD PRESSURE: 70 MMHG | WEIGHT: 170 LBS | HEART RATE: 68 BPM | BODY MASS INDEX: 27.23 KG/M2 | SYSTOLIC BLOOD PRESSURE: 108 MMHG

## 2019-07-18 DIAGNOSIS — L21.9 SEBORRHEIC DERMATITIS: Primary | ICD-10-CM

## 2019-07-18 PROCEDURE — 99214 OFFICE O/P EST MOD 30 MIN: CPT | Performed by: PHYSICIAN ASSISTANT

## 2019-07-18 RX ORDER — KETOCONAZOLE 20 MG/ML
SHAMPOO TOPICAL
Qty: 120 ML | Refills: 11 | Status: SHIPPED | OUTPATIENT
Start: 2019-07-18

## 2019-07-18 RX ORDER — CLOBETASOL PROPIONATE 0.5 MG/G
AEROSOL, FOAM TOPICAL
Qty: 50 G | Refills: 3 | Status: SHIPPED | OUTPATIENT
Start: 2019-07-18

## 2019-07-18 NOTE — PROGRESS NOTES
Subjective     Abhay Lopez is a 33 year old male who presents to clinic today for the following health issues:    HPI     Chief Complaint   Patient presents with     Bump on Head     x 1 year, states he was seen for it in the past and told it was a fungus       -------------------------------------  Patient is here in clinic with recurrent bump on his head. He has had this in the past and it did test positive for fungal elements. He reports that the flaking and thickening of skin improves with treatment but then returns. He is not on any maintenance treatment to avoid recurrence. He also reports that he thinks his son is having the same issues.     BP Readings from Last 3 Encounters:   07/18/19 108/70   09/19/18 115/79   11/21/17 115/77    Wt Readings from Last 3 Encounters:   07/18/19 77.1 kg (170 lb)   09/19/18 77.6 kg (171 lb)   11/21/17 75.3 kg (166 lb)      -------------------------------------  Reviewed and updated as needed this visit by Provider         Review of Systems   ROS COMP: Constitutional, HEENT, cardiovascular, pulmonary, gi and gu systems are negative, except as otherwise noted.      Objective    /70 (BP Location: Left arm, Patient Position: Chair, Cuff Size: Adult Regular)   Pulse 68   Temp 98.6  F (37  C) (Oral)   Wt 170 lb (77.1 kg)   SpO2 99%   BMI 27.23 kg/m    Body mass index is 27.23 kg/m .  Physical Exam   GENERAL: healthy, alert and no distress  NECK: no adenopathy, no asymmetry, masses, or scars and thyroid normal to palpation  RESP: lungs clear to auscultation - no rales, rhonchi or wheezes  CV: regular rate and rhythm, normal S1 S2, no S3 or S4, no murmur, click or rub, no peripheral edema and peripheral pulses strong  MS: no gross musculoskeletal defects noted, no edema  SKIN: there is thickening of skin and waxy,flaky changes to the scalp consistent with seborrheic dermatitis. No evidence of secondary infection to the scalp     Diagnostic Test Results:  none          Assessment & Plan       ICD-10-CM    1. Seborrheic dermatitis L21.9 clobetasol propionate (OLUX) 0.05 % external foam     ketoconazole (NIZORAL) 2 % external shampoo     DERMATOLOGY REFERRAL          I discussed diagnosis and treatment with patient as well as the fact that he will likely need to continue some maintenance treatment to avoid recurrence . I will also give him a dermatology referral as he would like to also discuss hair loss concerns. Follow up in clinic as needed.   See Patient Instructions    No follow-ups on file.    Carey Styles PA-C  [facility]

## 2019-07-18 NOTE — PATIENT INSTRUCTIONS
Patient Education     Understanding Seborrheic Dermatitis    Seborrheic dermatitis is a common type of rash that causes red, scaly, greasy skin. It occurs on skin that has oil glands, such as the face, scalp, and upper chest. It tends to last a long time, or go away and come back. Seborrheic dermatitis is not spread from person to person.  How to say it  [id number]   What causes seborrheic dermatitis?  The cause is not yet known. It may be partly caused by a type of yeast that grows on skin, along with excess oil production. Experts are still learning more. It s more common in men than women, and it can occur at any age. It happens more often in people with HIV/AIDS, Parkinson disease, alcoholic pancreatitis, hepatitis, or cancer. It can also get worse during times of stress.  Symptoms of seborrheic dermatitis  Symptoms can include skin that is:    Bumpy    Covered with yellow scales or crusts    Cracked    Greasy    Itchy    Leaking fluid    Painful    Red or orange  These symptoms can occur on skin:    Around the nose    Behind the ears    In the beard    In the eyebrows    On the scalp, also known as dandruff    On the upper chest  You may also have acne, inflamed eyelids (blepharitis), or other skin conditions at the same time.  Treatment for seborrheic dermatitis  Treatment can reduce symptoms for a period of time. The types of treatments most often used include:    Antifungal shampoo, body wash, or cream. These contain medicines such as ketoconazole, fluconazole, selenium sulfide, ciclopirox, or tea tree oil.    Corticosteroid cream or ointment. These containmedicines such ashydrocortisone or fluocinolone acetonide.    Calcineurin inhibitorcream or ointment. These contain medicines such as pimecrolimus or tacrolimus.    Shampoo or cream with other medicines. These contain medicines such as coal tar, salicylic acid, or zinc pyrithione.    Sodium sulfacetemide creams and washes. These may  also help.  Wash your skin gently. You can remove scales with oil and gentle rubbing or a brush.  Living with seborrheic dermatitis  Seborrheic dermatitis is an ongoing (chronic) condition. It can go away and then come back. You will likely need to use shampoo, cream, or ointment with medicine once or twice a week. This can help to keep symptoms from coming back or getting worse.  When to call your healthcare provider  Call your healthcare provider right away if you have any of these:    Symptoms that don t get better, or get worse    New symptoms   Date Last Reviewed: 5/1/2016 2000-2018 [employer]. [address] [place] [address], [place] [address]. All rights reserved. This information is not intended as a substitute for professional medical care. Always follow your healthcare professional's instructions.

## 2020-05-14 ENCOUNTER — VIRTUAL VISIT (OUTPATIENT)
Dept: FAMILY MEDICINE | Facility: CLINIC | Age: 35
End: 2020-05-14
Payer: COMMERCIAL

## 2020-05-14 DIAGNOSIS — J34.89 RHINORRHEA: ICD-10-CM

## 2020-05-14 DIAGNOSIS — B35.0 KERION: Primary | ICD-10-CM

## 2020-05-14 PROCEDURE — 99213 OFFICE O/P EST LOW 20 MIN: CPT | Mod: 95 | Performed by: PHYSICIAN ASSISTANT

## 2020-05-14 RX ORDER — LORATADINE 10 MG/1
10 TABLET ORAL DAILY
Qty: 30 TABLET | Refills: 1 | Status: SHIPPED | OUTPATIENT
Start: 2020-05-14

## 2020-05-14 RX ORDER — FLUTICASONE PROPIONATE 50 MCG
1-2 SPRAY, SUSPENSION (ML) NASAL DAILY
Qty: 18.2 ML | Refills: 1 | Status: SHIPPED | OUTPATIENT
Start: 2020-05-14

## 2020-05-14 RX ORDER — TERBINAFINE HYDROCHLORIDE 250 MG/1
250 TABLET ORAL DAILY
Qty: 42 TABLET | Refills: 0 | Status: SHIPPED | OUTPATIENT
Start: 2020-05-14 | End: 2020-06-25
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
## 2020-05-14 NOTE — PATIENT INSTRUCTIONS
Betty Blank,    Thank you for allowing [facility] to manage your care.    Your symptoms are most likely due to a fungal infection of your scalp. This will improve with the medication and lightly washing the area daily with soap and water. Avoid touching the area.      Take the nasal spray and allergy medicine as well. This should help with your stuffy/runny nose and itchy eyes.     I ordered some blood work, please go to the laboratory to get your laboratory studies.     I sent your prescriptions to your pharmacy.     Call for a follow up visit with me in 2-4 weeks to assess how things are going.    If you have any questions or concerns, please feel free to call us at [phone]    Sincerely,    Vernon Silveira PA-C    Did you know?  You can schedule an e-Visit for certain simple non-emergent issue for your convenience.  To learn more about or start an eVisit, simply login to [employer], click  Visits  on top banner, click  Start a Virtual Visit  drop down, and click  Symptom-Specific E-Visit     Patient Education     Ringworm of the Scalp  Ringworm is caused by a fungus, not a worm. It can be passed from animals (such as cats and dogs) or people infected with the fungus. The infection starts as a small, red, itchy sore. It grows larger, in the shape of a round, 1-to-2-inch ring with clear skin in the center. When the fungus infects the scalp, it causes round bald patches that are itchy and flaky. Sometimes these areas may scar. Or the hair may not grow back.  Ringworm of the scalp can be hard to treat. You will need to take oral medicine for 2 to 12 weeks. Be sure to follow special instructions for taking the medicine. Creams don't work to clear up the infection.  Home care  Follow these guidelines when caring for yourself at home:    It may take up to 12 weeks for the infection to fully clear. To stop it from coming back, keep taking the medicine until the rash is gone and your healthcare provider has  told you to stop. Throw out any hennessy, hairbrushes, barrettes, hats, or other products that have touched your head. Or you can disinfect these items by soaking them in diluted chlorine bleach. (Use 1/4 cup bleach per gallon of water). Clean towels, pillowcases, sheets, and other linens or clothing each time they may have touched the infected area. Wash them in hot water with a strong detergent. Dry them on high heat. Use a different towel to dry your head. Don t use this towel on the rest of your body.    Ringworm of the scalp is very contagious. This means it spreads easily to other people. Other family members may need to be treated. Don't share hats, hennessy, hairbrushes, towels, pillowcases, or helmets while infected. Any child with ringworm of the scalp should stay out of school or day care until prescription medicine is started, or until the healthcare provider says it is OK to return. Your child should not play contact sports until the provider says it is OK.    Shampooing with a medicated shampoo will help keep the ringworm from spreading. But it will not cure the ringworm.    You don't need to cut or shave the hair.    Have your  check your pet for signs of ringworm.  Follow-up care  Follow up with your healthcare provider, or as advised.  When to seek medical advice  Call your healthcare provider right away if any of these occur:    Ringworm comes back    Scalp swelling or pain get worse    Fluid or pus drains from the rash    Fever in adults: 100.4 F (38.0 C) or above, lasting for 24 to 48 hours    Fever in a child (see Fever and children, below)     Fever and children  Always use a digital thermometer to check your child s temperature. Never use a mercury thermometer.  For infants and toddlers, be sure to use a rectal thermometer correctly. A rectal thermometer may accidentally poke a hole in (perforate) the rectum. It may also pass on germs from the stool. Always follow the product maker s  directions for proper use. If you don t feel comfortable taking a rectal temperature, use another method. When you talk to your child s healthcare provider, tell him or her which method you used to take your child s temperature.  Here are guidelines for fever temperature. Ear temperatures aren t accurate before 6 months of age. Don t take an oral temperature until your child is at least 4 years old.  Infant under 3 months old:    Ask your child s healthcare provider how you should take the temperature.    Rectal or forehead (temporal artery) temperature of 100.4 F (38 C) or higher, or as directed by the provider    Armpit temperature of 99 F (37.2 C) or higher, or as directed by the provider  Child age 3 to 36 months:    Rectal, forehead (temporal artery), or ear temperature of 102 F (38.9 C) or higher, or as directed by the provider    Armpit temperature of 101 F (38.3 C) or higher, or as directed by the provider  Child of any age:    Repeated temperature of 104 F (40 C) or higher, or as directed by the provider    Fever that lasts more than 24 hours in a child under 2 years old. Or a fever that lasts for 3 days in a child 2 years or older.   Date Last Reviewed: 8/1/2016 2000-2019 [employer]. [address] [place] [address], [place] [address]. All rights reserved. This information is not intended as a substitute for professional medical care. Always follow your healthcare professional's instructions.           Patient Education     Understanding Nasal Allergies  Nasal allergies (also called allergic rhinitis) are a common health problem. They may be seasonal. This means they cause symptoms only at certain times of the year. Or they may be perennial. This means they cause symptoms all year long. Other health problems, such as asthma, often occur along with allergies as well.    What is an allergic reaction?  An allergy is a reaction to a substance called an allergen. Common allergens include:    Wind-borne  pollen    Mold    Dust mites    Furry and feathered animals    Cockroaches  Normally, allergens are harmless. But when a person has allergies, the body thinks they are harmful. The body then attacks allergens with antibodies. Antibodies are attached to special cells called mast cells. Allergens stick to the antibodies. This makes the mast cells release histamine and other chemicals. This is an allergic reaction. The chemicals irritate nearby nasal tissue. This causes nasal allergy symptoms.  Common nasal allergy symptoms  Allergies can cause nasal tissue to swell. This makes the air passages smaller. The nose may feel stuffed up. The nose may also make extra mucus, which can plug the nasal passages or drip out of the nose. Mucus can drip down the back of the throat (postnasal drip) as well. Sinus tissue can swell. This may cause pain and headache. Common allergy symptoms include:    Runny nose with clear, watery discharge    Stuffy nose (nasal congestion)    Drainage down your throat (postnasal drip)    Sneezing    Red, watery eyes    Itchy nose, eyes, ears, and throat    Plugged-up ears (ear congestion)    Sore throat    Coughing    Sinus pain and swelling    Headache  It may not be allergies  Other health problems can cause symptoms like those of nasal allergies. These include:    Nonallergic rhinitis and viruses such as colds    Irritants and pollutants, such as strong odors or smoke    Certain medicines    Changes in the weather   Treatment  Your healthcare provider will evaluate you to find the cause of your symptoms then recommend treatment. If your symptoms are due to nasal allergies, your healthcare provider may prescribe nasal steroid sprays or oral antihistamines to help reduce symptoms. Avoidance of the allergen will also be suggested. You may also be referred to an allergist.   Date Last Reviewed: 10/1/2016    0539-4042 [employer]. [address] [place] [address], [place] [address]. All rights  reserved. This information is not intended as a substitute for professional medical care. Always follow your healthcare professional's instructions.

## 2020-05-15 DIAGNOSIS — B35.0 KERION: ICD-10-CM

## 2020-05-15 LAB
ALBUMIN SERPL-MCNC: 3.8 G/DL (ref 3.4–5)
ALP SERPL-CCNC: 117 U/L (ref 40–150)
ALT SERPL W P-5'-P-CCNC: 67 U/L (ref 0–70)
AST SERPL W P-5'-P-CCNC: 34 U/L (ref 0–45)
BILIRUB DIRECT SERPL-MCNC: 0.1 MG/DL (ref 0–0.2)
BILIRUB SERPL-MCNC: 0.4 MG/DL (ref 0.2–1.3)
PROT SERPL-MCNC: 7.7 G/DL (ref 6.8–8.8)

## 2020-05-15 PROCEDURE — 80076 HEPATIC FUNCTION PANEL: CPT | Performed by: PHYSICIAN ASSISTANT

## 2020-05-15 PROCEDURE — 36415 COLL VENOUS BLD VENIPUNCTURE: CPT | Performed by: PHYSICIAN ASSISTANT

## 2021-05-14 ENCOUNTER — OFFICE VISIT (OUTPATIENT)
Dept: FAMILY MEDICINE | Facility: CLINIC | Age: 36
End: 2021-05-14
Payer: COMMERCIAL

## 2021-05-14 VITALS
TEMPERATURE: 97.2 F | HEART RATE: 75 BPM | RESPIRATION RATE: 16 BRPM | OXYGEN SATURATION: 99 % | DIASTOLIC BLOOD PRESSURE: 80 MMHG | SYSTOLIC BLOOD PRESSURE: 114 MMHG | WEIGHT: 172.38 LBS | HEIGHT: 69 IN | BODY MASS INDEX: 25.53 KG/M2

## 2021-05-14 DIAGNOSIS — J30.2 SEASONAL ALLERGIC RHINITIS, UNSPECIFIED TRIGGER: ICD-10-CM

## 2021-05-14 DIAGNOSIS — L98.9 LESION OF SKIN OF SCALP: Primary | ICD-10-CM

## 2021-05-14 PROCEDURE — 99214 OFFICE O/P EST MOD 30 MIN: CPT | Performed by: PHYSICIAN ASSISTANT

## 2021-05-14 RX ORDER — TERBINAFINE HYDROCHLORIDE 250 MG/1
250 TABLET ORAL DAILY
Qty: 42 TABLET | Refills: 0 | Status: SHIPPED | OUTPATIENT
Start: 2021-05-14 | End: 2021-06-25

## 2021-05-14 RX ORDER — LORATADINE 10 MG/1
10 TABLET ORAL DAILY
Qty: 90 TABLET | Refills: 0 | Status: SHIPPED | OUTPATIENT
Start: 2021-05-14

## 2021-05-14 ASSESSMENT — ENCOUNTER SYMPTOMS
SHORTNESS OF BREATH: 0
COUGH: 0
FEVER: 0
ABDOMINAL PAIN: 0
NERVOUS/ANXIOUS: 0
LIGHT-HEADEDNESS: 0

## 2021-05-14 ASSESSMENT — MIFFLIN-ST. JEOR: SCORE: 1695.65

## 2021-05-14 ASSESSMENT — PAIN SCALES - GENERAL: PAINLEVEL: NO PAIN (0)

## 2021-05-14 NOTE — PROGRESS NOTES
Assessment & Plan     Lesion of skin of scalp  Patient is a 35-year-old male presents to clinic due to persistent lesion on scalp.  Patient was treated for tinea capitis 1 year ago with oral terbinafine.  He notes improvement, but then lesion returned.  Vital signs normal.  Physical exam significant for approximately 3 cm lesion on scalp with hair loss.  Lesion is most consistent with tinea.  Patient prescribed terbinafine.  Discussed that if rash does not improve with treatment, dermatology follow-up is recommended.  Patient is agreeable.  Referral placed.  - terbinafine (LAMISIL) 250 MG tablet; Take 1 tablet (250 mg) by mouth daily  - DERMATOLOGY ADULT REFERRAL; Future    Seasonal allergic rhinitis, unspecified trigger  Patient also notes seasonal allergies treated successfully with Claritin in past.  Patient would like refill of this medication.  No side effects.  Refill provided.  - loratadine (CLARITIN) 10 MG tablet; Take 1 tablet (10 mg) by mouth daily      See Patient Instructions    Return in about 6 weeks (around 6/25/2021), or if symptoms worsen or fail to improve.    Deedee Samuels PA-C  [facility] DANNY Blank is a 35 year old who presents for the following health issues     HPI     - Sore area on top of head. No injury. There is a bump on area with swelling. NO bleeding or drainage. Area is not warm to the touch. No fevers. There is hair loss. Son had similar lesion which resolved with medication. Patient notes that area got better with treatment last year but did not resolve completely. No pain.  Intermittent itching.     Patient also notes seasonal allergies which he treats with Claritin successfully.  No side effects.  Patient would like a refill of this medication.    Patient last evaluated for this area of concern exactly 1 year ago and was diagnosed with tinea capitis with Kerion.  Patient was using ketoconazole shampoo.  There was no evidence of cellulitis,  "abscess, or necrotizing fasciitis.  Patient prescribed 6-week course of terbinafine.  Follow-up in 2 weeks for reevaluation was recommended.      Review of Systems   Constitutional: Negative for fever.   HENT: Negative for congestion.    Respiratory: Negative for cough and shortness of breath.    Cardiovascular: Negative for chest pain.   Gastrointestinal: Negative for abdominal pain.   Skin: Negative for rash.        Lesion on scalp.    Neurological: Negative for light-headedness.   Psychiatric/Behavioral: The patient is not nervous/anxious.             Objective    /80   Pulse 75   Temp 97.2  F (36.2  C) (Tympanic)   Resp 16   Ht 1.75 m (5' 8.9\")   Wt 78.2 kg (172 lb 6 oz)   SpO2 99%   BMI 25.53 kg/m    Body mass index is 25.53 kg/m .  Physical Exam  Vitals signs and nursing note reviewed.   Constitutional:       General: He is not in acute distress.     Appearance: Normal appearance.   HENT:      Head: Normocephalic and atraumatic.      Comments: Approximately 3 cm irregularly shaped lesion on scalp with hair loss.  No clear flaking, erythema, or central clearing.  Small amount of darkened crusting present.  No tenderness to palpation.  Eyes:      Extraocular Movements: Extraocular movements intact.      Pupils: Pupils are equal, round, and reactive to light.   Neck:      Musculoskeletal: Normal range of motion.   Cardiovascular:      Rate and Rhythm: Normal rate and regular rhythm.      Heart sounds: Normal heart sounds.   Pulmonary:      Effort: Pulmonary effort is normal.      Breath sounds: Normal breath sounds. No wheezing.   Musculoskeletal: Normal range of motion.   Lymphadenopathy:      Cervical: No cervical adenopathy.   Skin:     General: Skin is warm and dry.   Neurological:      General: No focal deficit present.      Mental Status: He is alert.   Psychiatric:         Mood and Affect: Mood normal.         Behavior: Behavior normal.                          "

## 2021-05-14 NOTE — PATIENT INSTRUCTIONS
The lesion of your scalp is concerning for a fungus called tinea capitis, otherwise known as ringworm.  For treatment you been prescribed terbinafine.  If your symptoms are not improving over the next 4-6 weeks with this medicine, I would like you to be seen by dermatology.  I have placed a referral   And they will call you to schedule your appointment.    You have been prescribed Claritin for seasonal allergies.  Please use this during your allergy season.      Reach out with any questions or concerns.      Patient Education     Allergic Rhinitis  Allergic rhinitis is an allergic reaction that affects the nose, and often the eyes. It s often known as nasal allergies. Nasal allergies are often due to things in the environment that are breathed in. Depending what you are sensitive to, nasal allergies may occur only during certain seasons, or they may occur year round. Common indoor allergens include house dust mites, mold, cockroaches, and pet dander. Outdoor allergens include pollen from trees, grasses, and weeds.    Symptoms include a drippy, stuffy, and itchy nose. They also include sneezing and red and itchy eyes. You may feel tired more often. Severe allergies may also affect your breathing and trigger a condition called asthma.    Tests can be done to see what allergens are affecting you. You may be referred to an allergy specialist for testing and further evaluation.   Home care  Your healthcare provider may prescribe medicines to help relieve allergy symptoms. These may include oral medicines, nasal sprays, or eye drops.   Ask your provider for advice on how to stay away from substances that you are allergic to. Below are a few tips for each type of allergen.   Pet dander:    Do not have pets with fur and feathers.    If you have a pet, keep it out of your bedroom and off upholstered furniture.  Pollen:    When pollen counts are high, keep windows of your car and home closed. If possible, use an air  conditioner instead.    Wear a filter mask when mowing or doing yard work.  House dust mites:    Wash bedding every week in warm water and detergent and dry on a hot setting.    Cover the mattress, box spring, and pillows with allergy covers.     If possible, sleep in a room with no carpet, curtains, or upholstered furniture.  Cockroaches:    Store food in sealed containers.    Remove garbage from the home promptly.    Fix water leaks.  Mold:    Keep humidity low by using a dehumidifier or air conditioner. Keep the dehumidifier and air conditioner clean and free of mold.    Clean moldy areas with bleach and water. Don't mix bleach with other .  In general:    Vacuum once or twice a week. If possible, use a vacuum with a high-efficiency particulate air (HEPA) filter.    Don't smoke. Stay away from cigarette smoke. Cigarette smoke is an irritant that can make symptoms worse.  Follow-up care  Follow up as advised by the healthcare provider or our staff. If you were referred to an allergy specialist, make this appointment promptly.   When to seek medical advice  Call your healthcare provider or get medical care right away if the following occur:     Coughing    Fever of 100.4 F (38 C) or higher, or as directed by your healthcare provider    Raised red bumps (hives)    Continuing symptoms, new symptoms, or worsening symptoms  Call 911  Call 911 if you have:     Trouble breathing    Severe swelling of the face or severe itching of the eyes or mouth    Wheezing or shortness of breath    Chest tightness    Dizziness or lightheadedness    Feeling of doom    Stomach pain, bloating, vomiting, or diarrhea  [employer] last reviewed this educational content on 10/1/2019    5922-5882 [employer]. All rights reserved. This information is not intended as a substitute for professional medical care. Always follow your healthcare professional's instructions.           Patient Education     Ringworm of the  Scalp  Ringworm is caused by a fungus, not a worm. It can be spread from animals (such as cats and dogs) or people infected with the fungus. The infection starts as a small, red, itchy sore. It grows larger, in the shape of a round, 1-to-2-inch ring with clear skin in the center. When the fungus infects the scalp, it causes round bald patches that are itchy and flaky. Sometimes these areas may scar and the hair may not grow back.   Ringworm of the scalp can be hard to treat. You will need to take oral medicine for 2 to 12 weeks. Be sure to follow special instructions for taking the medicine. Creams and shampoos don't work to clear up the infection.   Home care  Follow these guidelines when caring for yourself at home:    It may take up to 12 weeks for the infection to fully clear. To stop it from coming back, keep taking the medicine until the rash is gone and your healthcare provider has told you to stop. Throw out any hennessy, hairbrushes, barrettes, hats, or other products that have touched your head. Or you can disinfect these items by soaking them in diluted chlorine bleach. (Use 1/4 cup bleach per gallon of water). Clean towels, pillowcases, sheets, and other linens or clothing each time they may have touched the infected area. Wash them in hot water with a strong detergent. Dry them on high heat. Use a different towel to dry your head. Don t use this towel on the rest of your body.    Ringworm of the scalp is very contagious. This means it spreads easily to other people. Other family members may need to be treated. Don't share hats, hennessy, hairbrushes, towels, pillowcases, or helmets while infected. Any child with ringworm of the scalp should stay out of school or day care until prescription medicine is started, or until the healthcare provider says it is OK to return. Your child should not play contact sports until the provider says it is OK.    Shampooing with a medicated shampoo will help keep the ringworm  from spreading. It may lessen the chance of spreading it to other people. But it will not cure the ringworm.    You don't need to cut or shave the hair. This will not help.    Have your  check your pet for signs of ringworm.  Follow-up care  Follow up with your healthcare provider, or as advised.  When to seek medical advice  Call your healthcare provider right away if any of these occur:    Ringworm comes back    Scalp swelling or pain get worse    Fluid or pus drains from the rash    Fever in adults: 100.4 F (38.0 C) or above, lasting for 24 to 48 hours    Fever in a child (see Fever and children, below)  Fever and children  Always use a digital thermometer to check your child s temperature. Never use a mercury thermometer.   For infants and toddlers, be sure to use a rectal thermometer correctly. A rectal thermometer may accidentally poke a hole in (perforate) the rectum. It may also pass on germs from the stool. Always follow the product maker s directions for proper use. If you don t feel comfortable taking a rectal temperature, use another method. When you talk to your child s healthcare provider, tell him or her which method you used to take your child s temperature.   Here are guidelines for fever temperature. Ear temperatures aren t accurate before 6 months of age. Don t take an oral temperature until your child is at least 4 years old.   Infant under 3 months old:    Ask your child s healthcare provider how you should take the temperature.    Rectal or forehead (temporal artery) temperature of 100.4 F (38 C) or higher, or as directed by the provider    Armpit temperature of 99 F (37.2 C) or higher, or as directed by the provider  Child age 3 to 36 months:    Rectal, forehead (temporal artery), or ear temperature of 102 F (38.9 C) or higher, or as directed by the provider    Armpit temperature of 101 F (38.3 C) or higher, or as directed by the provider  Child of any age:    Repeated temperature  of 104 F (40 C) or higher, or as directed by the provider    Fever that lasts more than 24 hours in a child under 2 years old. Or a fever that lasts for 3 days in a child 2 years or older.  [employer] last reviewed this educational content on 8/1/2019 2000-2021 [employer]. All rights reserved. This information is not intended as a substitute for professional medical care. Always follow your healthcare professional's instructions.

## 2021-11-11 NOTE — LETTER
March 31, 2017      Abhay Lopez  [address] [place] [address]        Dear Abhay,    Thank you for getting your care at [facility]. Please see below for your test results. Your results are normal.     Resulted Orders   Anti Treponema   Result Value Ref Range    Treponema pallidum Antibody Negative NEG   HIV Antigen Antibody Combo   Result Value Ref Range    HIV Antigen Antibody Combo  NR     Nonreactive   HIV-1 p24 Ag & HIV-1/HIV-2 Ab Not Detected     Hepatic Panel ([facility] in [place])   Result Value Ref Range    Albumin 4.2 3.8 - 5.0 mg/dL    Alkaline Phosphatase 102.3 31.7 - 110.7 U/L    ALT 18.4 0.0 - 45.0 U/L    AST 17.1 0.0 - 55.0 U/L    Bilirubin Direct 0.2 0.1 - 0.3 mg/dL    Bilirubin Total 0.4 0.2 - 1.3 mg/dL    Protein Total 7.5 6.8 - 8.8 g/dL       If you have any concerns about these results please call and leave a message for me or send a mymission2t message to the clinic.    Sincerely,    Julius Mo, DO    
Patient desires tubal ligation

## 2022-01-01 NOTE — PROGRESS NOTES
SUBJECTIVE:   Abhay Lopez is a 33 year old male who presents to clinic today for the following health issues:      Head sores      Duration: unknown    Description (location/character/radiation): has noticed some sores on his  Scalp and when he itches them his fingertips lose there skin.  Sometimes his scalp bleeds as well    Intensity:  moderate, severe    Accompanying signs and symptoms: none    History (similar episodes/previous evaluation): None    Precipitating or alleviating factors: None    Therapies tried and outcome: None     He was treated for tinea capitus (as was his child recently with griseofulvin with good results) last year and after being seen by dermatology I believe at the [facility] or [facility] in 3/2017.  He had some improvement but has noted significant worsening of late, as above.  He is concerned that the fungus has spread to his fingertips given skin loss last week after significant itching.  No skin rashes on hands or nail discoloration, fever, chills, or night sweats, purulence from scalp.    1. Skin lesion of scalp        PMH: Updated and/or reviewed in chart.    Family History: Updated and/or reviewed in chart.     ROS:  Constitutional, integument and psychiatric systems are otherwise negative.    OBJECTIVE:                                                    /79  Pulse 62  Temp 97  F (36.1  C) (Tympanic)  Resp 16  Wt 171 lb (77.6 kg)  BMI 27.39 kg/m2    GEN: No acute distress  EYES: No conjunctival injection or icterus, EOMI grossly intact  RESP: Unlabored, regular  NEURO: Normal gait, MAEx4, light touch sensation grossly intact  PSYCH: Normal mood and affect  SKIN: Mildly raised mildly erythematous lesion about a quarter size on crown with regrowth of some healthy-appear hear nearby.  One small, anterior satellite.  No induration, fluctuance, or purulence.       ASSESSMENT/PLAN:                                                    1. Skin lesion of scalp  Recurrence or worsening,  ----- Message from Thomas Lambert sent at 2022  8:21 AM CDT -----  Regarding: Needs Order for Bloodwork faxed over  Order for blood work needs to [facility] fax[facility]  Fax: [phone]  Mom's Phone: [phone]     unclear break in therapy or subtotal response.  Reasonable to re-trial, though we discussed that his lack of resolution signifies he may have need of other medical therapy vs overlying/underlying pathology (atypical seborrheic keratosis, other dermatitis) and would benefit from dermatology evaluation.  Discussed he is likely abrading (gently) his fingertip pads and there was no evidence of tinea or abnormal on fingertips today.  Patient agreed with plan and demonstrated understanding to contact us for help if not improving or sooner if worsening or if other questions or concerns arise.    - ketoconazole (NIZORAL) 2 % shampoo; Apply topically daily as needed for itching or irritation  Dispense: 120 mL; Refill: 1  - triamcinolone (KENALOG) 0.1 % ointment; Apply sparingly to affected area 2-3 times daily for 10 days.  Dispense: 15 g; Refill: 0  - ketoconazole (NIZORAL) 2 % cream; Apply topically 2 times daily  Dispense: 15 g; Refill: 1  - griseofulvin microsize (GRIFULVIN V) 500 MG TABS tablet; Take 1 tablet (500 mg) by mouth daily  Dispense: 30 tablet; Refill: 1  - DERMATOLOGY REFERRAL     Orders Placed This Encounter     DERMATOLOGY REFERRAL     ketoconazole (NIZORAL) 2 % shampoo     triamcinolone (KENALOG) 0.1 % ointment     ketoconazole (NIZORAL) 2 % cream     griseofulvin microsize (GRIFULVIN V) 500 MG TABS tablet          Alistair Penny MD

## 2023-04-21 ENCOUNTER — TELEPHONE (OUTPATIENT)
Dept: DERMATOLOGY | Facility: CLINIC | Age: 38
End: 2023-04-21

## 2023-04-21 ENCOUNTER — TELEPHONE (OUTPATIENT)
Dept: UROLOGY | Facility: CLINIC | Age: 38
End: 2023-04-21

## 2023-04-21 NOTE — TELEPHONE ENCOUNTER
4/21  services called patient and left voicemail.  provided [phone] for patient to call and schedule new patient appointment with a general dermatologist.     Cat christensen Procedure   Dermatology, Surgery, Urology  [facility]    ----- Message from Sharron Salinas RN sent at 4/20/2023  4:15 PM CDT -----  Please schedule accordingly.    Thanks,  T  ----- Message -----  From: Monica Infante  Sent: 4/20/2023   3:19 PM CDT  To: [facility],    We saw this patient's child in our clinic today.  Dad wanted to schedule something for himself in adult derm today.  I combed through the schedules and didn't see anything for months.  Am I seeing that right?  I don't want to have him get scheduled incorrectly, so am wondering if you are able to help me?  He states that he hasn't been seen in derm for many years-so would be a new patient.  I told him that someone would reach out to him to schedule.    Thank you so much,    Monica Infante  Pediatric Specialty   [facility]          [facility]  Electrophysiology Report      Attending MD Sherryle Prophet, MD    Procedures   Dual chamber pacemaker    Indications   CHB and SSS    Complication None  EBL  <63HJ    Sedation: Provided by anesthesia services    Start: 7487  Stop: 5233    Conclusion   Successful dual chamber pacemaker implantation. Description of Procedure  The patient was brought to the electrophysiology laboratory in the fasting state after informed consent was obtained. The patient was placed in the supine position and the left pectoral area was prepared and draped in a sterile fashion. The electrocardiogram, blood pressure, and oxygenation were monitored intra- and post-procedure. Intravenous antibiotic was given prior to the procedure for general antibiotic prophylaxis. After using buffered lidocaine 1% with epinephrine (1/100,000) for local anesthesia to the left pectoral area, venous access was obtained in the axillary vein at the level of the first rib via the modified Seldinger technique (attempted to use US however wire advanced in antegrade direction. A 4-5 cm incision was made inferior to the left clavicle. The subcutaneous tissues were dissected to the level of the pre-pectoral fascia and a pocket was fashioned via blunt dissection. Axillary vein access was then obtained from inside the pocket via the modified Seldinger technique. A 7 F sheath was inserted into the left subclavian over a J-wire. The right ventricular lead was inserted and the lead tip positioned at the RV apex under fluoroscopic guidance. Once appropriate placement was obtained and threshold measurements made to 's specifications, the lead was anchored to the pectoralis fascia using 2-0 Ethibond Excel. A 7 F sheath was then inserted into the left subclavian vein over a J-wire. The right atrial lead was inserted and the lead tip positioned in the RA appendage under fluoroscopic guidance.   Threshold measurements were obtained to the 's specifications, and the lead was anchored to the pectoralis fascia using 2-0 Ethibond Excel. After hemostasis was assured, the pulse generator was connected to the atrial and ventricular leads and appropriate lead pacing/impedance was confirmed. The device was anchored to the pocket using a 2-0 Ethibond Excel and then placed into the pocket with care to ensure the leads were positioned beneath the device within a TyRx pouch. The pocket was irrigated with antibiotic solution. The fascia was closed using interrupted vicryl sutures. The skin and subcutaneous tissues were approximated using running 3-0 Vicryl sutures, and the wound was sealed with steri-strips. A sterile dressing was applied to the site. The patient tolerated the procedure well and there were no complications. At the conclusion of the procedure, all sponges and sharps were accounted for by two counts. The attending physician, Carlos Gramajo MD was present for the entire procedure and for interpretation of data. Device and Lead Information  Pulse Generator Model # Manfacturer Serial # Location   [id number] Medtronic [id number] left pectoral, subcutaneous     Lead Model Number Manfacturer Serial Number Lead position   RA [id number] Medtronic [id number] RA appendage   RV 5076 Medtronic [id number] RV apex     Lead Sensing and Thresholds  Lead R/P sensing (mV) Threshold (V) Threshold PW (msec) Impedance (?) Final Voltage (V) Final PW (msec)   RA 3.3 2.0 0.4 494 3.5 0.4   RV 10.1 1.25 0.4 532 3.5 0.4     Bradycardia Settings  Aditya Mode LRL URL Pace AVD (ms) Sense AVD (ms) Mode Switching Mode SW Rate   DDDR 60 130 250 220      Proceduralist Notes:   - TyRx pouch used due to admission and malignancy. - Doxycycline x 5 days. - CXR at 1400.  - 5L oxygen while in house.

## 2023-04-27 NOTE — TELEPHONE ENCOUNTER
4/27 Patient is scheduled.     Cat christensen Procedure   Dermatology, Surgery, Urology  [facility]

## 2024-10-28 ENCOUNTER — OFFICE VISIT (OUTPATIENT)
Dept: FAMILY MEDICINE | Facility: CLINIC | Age: 39
End: 2024-10-28
Payer: COMMERCIAL

## 2024-10-28 VITALS
HEIGHT: 66 IN | SYSTOLIC BLOOD PRESSURE: 118 MMHG | OXYGEN SATURATION: 100 % | BODY MASS INDEX: 28.45 KG/M2 | DIASTOLIC BLOOD PRESSURE: 82 MMHG | WEIGHT: 177 LBS | TEMPERATURE: 97.2 F | HEART RATE: 75 BPM | RESPIRATION RATE: 16 BRPM

## 2024-10-28 DIAGNOSIS — L60.0 INGROWN NAIL: ICD-10-CM

## 2024-10-28 DIAGNOSIS — Z00.00 ROUTINE GENERAL MEDICAL EXAMINATION AT A HEALTH CARE FACILITY: Primary | ICD-10-CM

## 2024-10-28 DIAGNOSIS — Z11.59 NEED FOR HEPATITIS C SCREENING TEST: ICD-10-CM

## 2024-10-28 LAB
BASOPHILS # BLD AUTO: 0 10E3/UL (ref 0–0.2)
BASOPHILS NFR BLD AUTO: 1 %
EOSINOPHIL # BLD AUTO: 0.1 10E3/UL (ref 0–0.7)
EOSINOPHIL NFR BLD AUTO: 3 %
ERYTHROCYTE [DISTWIDTH] IN BLOOD BY AUTOMATED COUNT: 12.2 % (ref 10–15)
EST. AVERAGE GLUCOSE BLD GHB EST-MCNC: 103 MG/DL
HBA1C MFR BLD: 5.2 % (ref 0–5.6)
HCT VFR BLD AUTO: 45.3 % (ref 40–53)
HGB BLD-MCNC: 15.1 G/DL (ref 13.3–17.7)
IMM GRANULOCYTES # BLD: 0 10E3/UL
IMM GRANULOCYTES NFR BLD: 0 %
LYMPHOCYTES # BLD AUTO: 1.6 10E3/UL (ref 0.8–5.3)
LYMPHOCYTES NFR BLD AUTO: 48 %
MCH RBC QN AUTO: 28.9 PG (ref 26.5–33)
MCHC RBC AUTO-ENTMCNC: 33.3 G/DL (ref 31.5–36.5)
MCV RBC AUTO: 87 FL (ref 78–100)
MONOCYTES # BLD AUTO: 0.2 10E3/UL (ref 0–1.3)
MONOCYTES NFR BLD AUTO: 6 %
NEUTROPHILS # BLD AUTO: 1.5 10E3/UL (ref 1.6–8.3)
NEUTROPHILS NFR BLD AUTO: 43 %
PLATELET # BLD AUTO: 213 10E3/UL (ref 150–450)
RBC # BLD AUTO: 5.23 10E6/UL (ref 4.4–5.9)
WBC # BLD AUTO: 3.4 10E3/UL (ref 4–11)

## 2024-10-28 PROCEDURE — 99386 PREV VISIT NEW AGE 40-64: CPT | Performed by: FAMILY MEDICINE

## 2024-10-28 PROCEDURE — 85025 COMPLETE CBC W/AUTO DIFF WBC: CPT | Performed by: FAMILY MEDICINE

## 2024-10-28 PROCEDURE — 80061 LIPID PANEL: CPT | Performed by: FAMILY MEDICINE

## 2024-10-28 PROCEDURE — 86803 HEPATITIS C AB TEST: CPT | Performed by: FAMILY MEDICINE

## 2024-10-28 PROCEDURE — 80053 COMPREHEN METABOLIC PANEL: CPT | Performed by: FAMILY MEDICINE

## 2024-10-28 PROCEDURE — 36415 COLL VENOUS BLD VENIPUNCTURE: CPT | Performed by: FAMILY MEDICINE

## 2024-10-28 PROCEDURE — 83036 HEMOGLOBIN GLYCOSYLATED A1C: CPT | Performed by: FAMILY MEDICINE

## 2024-10-28 SDOH — HEALTH STABILITY: PHYSICAL HEALTH: ON AVERAGE, HOW MANY DAYS PER WEEK DO YOU ENGAGE IN MODERATE TO STRENUOUS EXERCISE (LIKE A BRISK WALK)?: 0 DAYS

## 2024-10-28 ASSESSMENT — SOCIAL DETERMINANTS OF HEALTH (SDOH): HOW OFTEN DO YOU GET TOGETHER WITH FRIENDS OR RELATIVES?: ONCE A WEEK

## 2024-10-28 ASSESSMENT — PAIN SCALES - GENERAL: PAINLEVEL_OUTOF10: NO PAIN (1)

## 2024-10-28 NOTE — PATIENT INSTRUCTIONS
Patient Education   Preventive Care Advice   This is general advice given by our system to help you stay healthy. However, your care team may have specific advice just for you. Please talk to your care team about your preventive care needs.  Nutrition  Eat 5 or more servings of fruits and vegetables each day.  Try wheat bread, brown rice and whole grain pasta (instead of white bread, rice, and pasta).  Get enough calcium and vitamin D. Check the label on foods and aim for 100% of the RDA (recommended daily allowance).  Lifestyle  Exercise at least 150 minutes each week  (30 minutes a day, 5 days a week).  Do muscle strengthening activities 2 days a week. These help control your weight and prevent disease.  No smoking.  Wear sunscreen to prevent skin cancer.  Have a dental exam and cleaning every 6 months.  Yearly exams  See your health care team every year to talk about:  Any changes in your health.  Any medicines your care team has prescribed.  Preventive care, family planning, and ways to prevent chronic diseases.  Shots (vaccines)   HPV shots (up to age 26), if you've never had them before.  Hepatitis B shots (up to age 59), if you've never had them before.  COVID-19 shot: Get this shot when it's due.  Flu shot: Get a flu shot every year.  Tetanus shot: Get a tetanus shot every 10 years.  Pneumococcal, hepatitis A, and RSV shots: Ask your care team if you need these based on your risk.  Shingles shot (for age 50 and up)  General health tests  Diabetes screening:  Starting at age 35, Get screened for diabetes at least every 3 years.  If you are younger than age 35, ask your care team if you should be screened for diabetes.  Cholesterol test: At age 39, start having a cholesterol test every 5 years, or more often if advised.  Bone density scan (DEXA): At age 50, ask your care team if you should have this scan for osteoporosis (brittle bones).  Hepatitis C: Get tested at least once in your life.  STIs (sexually  transmitted infections)  Before age 24: Ask your care team if you should be screened for STIs.  After age 24: Get screened for STIs if you're at risk. You are at risk for STIs (including HIV) if:  You are sexually active with more than one person.  You don't use condoms every time.  You or a partner was diagnosed with a sexually transmitted infection.  If you are at risk for HIV, ask about PrEP medicine to prevent HIV.  Get tested for HIV at least once in your life, whether you are at risk for HIV or not.  Cancer screening tests  Cervical cancer screening: If you have a cervix, begin getting regular cervical cancer screening tests starting at age 21.  Breast cancer scan (mammogram): If you've ever had breasts, begin having regular mammograms starting at age 40. This is a scan to check for breast cancer.  Colon cancer screening: It is important to start screening for colon cancer at age 45.  Have a colonoscopy test every 10 years (or more often if you're at risk) Or, ask your provider about stool tests like a FIT test every year or Cologuard test every 3 years.  To learn more about your testing options, visit:   .  For help making a decision, visit:   https://bit.ly/fd40293.  Prostate cancer screening test: If you have a prostate, ask your care team if a prostate cancer screening test (PSA) at age 55 is right for you.  Lung cancer screening: If you are a current or former smoker ages 50 to 80, ask your care team if ongoing lung cancer screenings are right for you.  For informational purposes only. Not to replace the advice of your health care provider. Copyright   2023 [employer]. All rights reserved. Clinically reviewed by the [facility] Transitions Program. [employer] 608777 - REV 01/24.

## 2024-10-28 NOTE — LETTER
October 31, 2024      Abhay MALHOTRA Aryraghu  [id number] [facility]  [place] [address]        Dear ,    We are writing to inform you of your test results.    The results are within the expected range. Please continue with the current plan of care and let us know if there are any questions or concerns.      Regards,   Shanell New MD     Resulted Orders   Hepatitis C Screen Reflex to HCV RNA Quant and Genotype   Result Value Ref Range    Hepatitis C Antibody Nonreactive Nonreactive      Comment:      A nonreactive screening test result does not exclude the possibility of exposure to or infection with HCV. Nonreactive screening test results in individuals with prior exposure to HCV may be due to antibody levels below the limit of detection of this assay or lack of reactivity to the HCV antigens used in this assay. Patients with recent HCV infections (<3 months from time of exposure) may have false-negative HCV antibody results due to the time needed for seroconversion (average of 8 to 9 weeks).   Lipid panel reflex to direct LDL Non-fasting   Result Value Ref Range    Cholesterol 180 <200 mg/dL    Triglycerides 115 <150 mg/dL    Direct Measure HDL 35 (L) >=40 mg/dL    LDL Cholesterol Calculated 122 (H) <100 mg/dL    Non HDL Cholesterol 145 (H) <130 mg/dL    Patient Fasting > 8hrs? No     Narrative    Cholesterol  Desirable: < 200 mg/dL  Borderline High: 200 - 239 mg/dL  High: >= 240 mg/dL    Triglycerides  Normal: < 150 mg/dL  Borderline High: 150 - 199 mg/dL  High: 200-499 mg/dL  Very High: >= 500 mg/dL    Direct Measure HDL  Female: >= 50 mg/dL   Male: >= 40 mg/dL    LDL Cholesterol  Desirable: < 100 mg/dL  Above Desirable: 100 - 129 mg/dL   Borderline High: 130 - 159 mg/dL   High:  160 - 189 mg/dL   Very High: >= 190 mg/dL    Non HDL Cholesterol  Desirable: < 130 mg/dL  Above Desirable: 130 - 159 mg/dL  Borderline High: 160 - 189 mg/dL  High: 190 - 219 mg/dL  Very High: >= 220 mg/dL   Comprehensive metabolic panel    Result Value Ref Range    Sodium 140 135 - 145 mmol/L    Potassium 4.0 3.4 - 5.3 mmol/L    Carbon Dioxide (CO2) 25 22 - 29 mmol/L    Anion Gap 11 7 - 15 mmol/L    Urea Nitrogen 16.6 6.0 - 20.0 mg/dL    Creatinine 0.98 0.67 - 1.17 mg/dL    GFR Estimate >90 >60 mL/min/1.73m2      Comment:      eGFR calculated using 2021 CKD-EPI equation.    Calcium 9.1 8.8 - 10.4 mg/dL      Comment:      Reference intervals for this test were updated on 7/16/2024 to reflect our healthy population more accurately. There may be differences in the flagging of prior results with similar values performed with this method. Those prior results can be interpreted in the context of the updated reference intervals.    Chloride 104 98 - 107 mmol/L    Glucose 126 (H) 70 - 99 mg/dL    Alkaline Phosphatase 110 40 - 150 U/L    AST 26 0 - 45 U/L    ALT 29 0 - 70 U/L    Protein Total 7.6 6.4 - 8.3 g/dL    Albumin 4.3 3.5 - 5.2 g/dL    Bilirubin Total 0.4 <=1.2 mg/dL    Patient Fasting > 8hrs? No    Hemoglobin A1c   Result Value Ref Range    Estimated Average Glucose 103 <117 mg/dL    Hemoglobin A1C 5.2 0.0 - 5.6 %      Comment:      Normal <5.7%   Prediabetes 5.7-6.4%    Diabetes 6.5% or higher     Note: Adopted from ADA consensus guidelines.   CBC with platelets and differential   Result Value Ref Range    WBC Count 3.4 (L) 4.0 - 11.0 10e3/uL    RBC Count 5.23 4.40 - 5.90 10e6/uL    Hemoglobin 15.1 13.3 - 17.7 g/dL    Hematocrit 45.3 40.0 - 53.0 %    MCV 87 78 - 100 fL    MCH 28.9 26.5 - 33.0 pg    MCHC 33.3 31.5 - 36.5 g/dL    RDW 12.2 10.0 - 15.0 %    Platelet Count 213 150 - 450 10e3/uL    % Neutrophils 43 %    % Lymphocytes 48 %    % Monocytes 6 %    % Eosinophils 3 %    % Basophils 1 %    % Immature Granulocytes 0 %    Absolute Neutrophils 1.5 (L) 1.6 - 8.3 10e3/uL    Absolute Lymphocytes 1.6 0.8 - 5.3 10e3/uL    Absolute Monocytes 0.2 0.0 - 1.3 10e3/uL    Absolute Eosinophils 0.1 0.0 - 0.7 10e3/uL    Absolute Basophils 0.0 0.0 - 0.2 10e3/uL     Absolute Immature Granulocytes 0.0 <=0.4 10e3/uL       If you have any questions or concerns, please call the clinic at the number listed above.
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
## 2024-10-29 ENCOUNTER — PATIENT OUTREACH (OUTPATIENT)
Dept: CARE COORDINATION | Facility: CLINIC | Age: 39
End: 2024-10-29
Payer: COMMERCIAL

## 2024-10-29 LAB
ALBUMIN SERPL BCG-MCNC: 4.3 G/DL (ref 3.5–5.2)
ALP SERPL-CCNC: 110 U/L (ref 40–150)
ALT SERPL W P-5'-P-CCNC: 29 U/L (ref 0–70)
ANION GAP SERPL CALCULATED.3IONS-SCNC: 11 MMOL/L (ref 7–15)
AST SERPL W P-5'-P-CCNC: 26 U/L (ref 0–45)
BILIRUB SERPL-MCNC: 0.4 MG/DL
BUN SERPL-MCNC: 16.6 MG/DL (ref 6–20)
CALCIUM SERPL-MCNC: 9.1 MG/DL (ref 8.8–10.4)
CHLORIDE SERPL-SCNC: 104 MMOL/L (ref 98–107)
CHOLEST SERPL-MCNC: 180 MG/DL
CREAT SERPL-MCNC: 0.98 MG/DL (ref 0.67–1.17)
EGFRCR SERPLBLD CKD-EPI 2021: >90 ML/MIN/1.73M2
FASTING STATUS PATIENT QL REPORTED: NO
FASTING STATUS PATIENT QL REPORTED: NO
GLUCOSE SERPL-MCNC: 126 MG/DL (ref 70–99)
HCO3 SERPL-SCNC: 25 MMOL/L (ref 22–29)
HCV AB SERPL QL IA: NONREACTIVE
HDLC SERPL-MCNC: 35 MG/DL
LDLC SERPL CALC-MCNC: 122 MG/DL
NONHDLC SERPL-MCNC: 145 MG/DL
POTASSIUM SERPL-SCNC: 4 MMOL/L (ref 3.4–5.3)
PROT SERPL-MCNC: 7.6 G/DL (ref 6.4–8.3)
SODIUM SERPL-SCNC: 140 MMOL/L (ref 135–145)
TRIGL SERPL-MCNC: 115 MG/DL

## 2024-10-31 ENCOUNTER — OFFICE VISIT (OUTPATIENT)
Dept: PODIATRY | Facility: CLINIC | Age: 39
End: 2024-10-31
Attending: FAMILY MEDICINE
Payer: COMMERCIAL

## 2024-10-31 VITALS
HEART RATE: 80 BPM | DIASTOLIC BLOOD PRESSURE: 83 MMHG | HEIGHT: 66 IN | WEIGHT: 177 LBS | SYSTOLIC BLOOD PRESSURE: 118 MMHG | BODY MASS INDEX: 28.45 KG/M2

## 2024-10-31 DIAGNOSIS — L60.0 INGROWN NAIL OF GREAT TOE OF RIGHT FOOT: ICD-10-CM

## 2024-10-31 DIAGNOSIS — L60.0 INGROWN NAIL OF GREAT TOE OF LEFT FOOT: Primary | ICD-10-CM

## 2024-10-31 PROCEDURE — 11750 EXCISION NAIL&NAIL MATRIX: CPT | Mod: TA | Performed by: PODIATRIST

## 2024-10-31 PROCEDURE — 11750 EXCISION NAIL&NAIL MATRIX: CPT | Mod: T5 | Performed by: PODIATRIST

## 2024-10-31 NOTE — NURSING NOTE
"Chief Complaint   Patient presents with    Ingrown Toenail     BL great toenails       Initial /83   Pulse 80   Ht 1.676 m (5' 6\")   Wt 80.3 kg (177 lb)   BMI 28.57 kg/m   Estimated body mass index is 28.57 kg/m  as calculated from the following:    Height as of this encounter: 1.676 m (5' 6\").    Weight as of this encounter: 80.3 kg (177 lb).  Medications and allergies reviewed.      Zaria ALBERTO MA    "

## 2024-10-31 NOTE — PROGRESS NOTES
"Abhay Lopez is a 39 year old male who presents with a chief complaint of a painful ingrown toenail to the left and right great toe.  The patient relates pain when wearing shoes.  The patient denies any redness extending up the big toe into the foot.  The patient relates the condition has been getting worse over the past several weeks.         Pertinent medical, surgical and family history was reviewed in the chart.    Vitals: /83   Pulse 80   Ht 1.676 m (5' 6\")   Wt 80.3 kg (177 lb)   BMI 28.57 kg/m    BMI= Body mass index is 28.57 kg/m .    LOWER EXTREMITY PHYSICAL EXAM    Dermatologic: One notes an inflamed nail border of the left and right great toe.  There is noted erythema and edema located around the labial fold and does not extend past the interphalangeal joint.  There is no apparent purulent drainage noted.  There is pain on palpation to the proximal aspect of the nail border.  Otherwise, the skin is intact to both lower extremities without significant lesions, rash or abrasion.           Vascular: DP & PT pulses are intact & regular on the left and right.   CFT and skin temperature is normal to the left and right lower extremities.     Neurologic: Lower extremity sensation is intact to light touch.  No evidence of weakness in the left and right lower extremities.        Musculoskeletal: Patient is ambulatory without assistive device or brace.  No gross ankle deformity noted.  No foot or ankle joint effusion is noted.         ASSESSMENT / PLAN:     ICD-10-CM    1. Ingrown nail of great toe of left foot  L60.0 REMOVAL NAIL/NAIL BED, PARTIAL OR COMPLETE      2. Ingrown nail of great toe of right foot  L60.0 REMOVAL NAIL/NAIL BED, PARTIAL OR COMPLETE          Plan:  I have explained to Abhay about the condition.  The potential causes and nature of an ingrown toenail were discussed with the patient.  We reviewed the natural history and prognosis of the condition and potential risks if no treatment is " provided.  Treatment options discussed included conservative management (oral antibiotics, soaking of foot, adequate width shoes)  as well as surgical management (partial or total nail removal).  The pros and cons of both forms as well as risks and benefits of treatment were reviewed.       At this point, I recommended having the offending nail border permanently removed from the left and right great toe.  I have explained to the patient all of the possible risks, benefits, alternatives to the procedure.  The patient consented to the proposed procedure.  The left and right hallux was swabbed with alcohol.  Next, approximately 3 cc of 1% lidocaine plain was injected around the left and right hallux.  The left and right hallux was then prepped with Betadine ointment.  A tourniquet was applied to the left and right hallux.  A [place] elevator was utilized to free up the eponychium of the offending nail border.  Next, the offending nail border was split using an English anvil back to the matrix.  Next, utilizing a straight hemostat, the offending nail border was avulsed in toto.  The wound bed was debrided on any remaining nail and hyperkeratotic skin.  Next, the offending nail matrix was treated with 89% phenol using microtip cotton applicators for 30 seconds.  The wound was then irrigated and dressed with bacitracin antibiotic ointment and a compressive  sterile dressing.  The tourniquet was removed and a prompt hyperemic response was noted.  The patient tolerated the procedure well with no complications.  The patient was given post procedure instructions for the care of the wound.  The patient was informed that it is common to experience redness with watery drainage coming from the treated areas of the toe related to the phenol application.  The patient may return for reevaluation and was instructed to notify the office if any redness extending past the big toe joint or fever with chills are experienced before then.       There is low risk of morbidity with the procedure.  There was no overlap in work associated with the evaluation/management and the work associated with the procedure.    Abhay verbalized agreement with and understanding of the rational for the diagnosis and treatment plan.  All questions were answered to best of my ability and the patient's satisfaction. The patient was advised to contact the clinic with any questions that may arise after the clinic visit.      Disclaimer: This note consists of symbols derived from keyboarding, dictation and/or voice recognition software. As a result, there may be errors in the script that have gone undetected. Please consider this when interpreting information found in this chart.      SUSAN Duncan D.P.M., [email or web address].

## 2024-10-31 NOTE — LETTER
"10/31/2024      Abhay Lopez  [address] [address] [address]      Dear Colleague,    Thank you for referring your patient, Abhay Lopez, to the [facility]. Please see a copy of my visit note below.    Abhay Lopez is a 39 year old male who presents with a chief complaint of a painful ingrown toenail to the left and right great toe.  The patient relates pain when wearing shoes.  The patient denies any redness extending up the big toe into the foot.  The patient relates the condition has been getting worse over the past several weeks.         Pertinent medical, surgical and family history was reviewed in the chart.    Vitals: /83   Pulse 80   Ht 1.676 m (5' 6\")   Wt 80.3 kg (177 lb)   BMI 28.57 kg/m    BMI= Body mass index is 28.57 kg/m .    LOWER EXTREMITY PHYSICAL EXAM    Dermatologic: One notes an inflamed nail border of the left and right great toe.  There is noted erythema and edema located around the labial fold and does not extend past the interphalangeal joint.  There is no apparent purulent drainage noted.  There is pain on palpation to the proximal aspect of the nail border.  Otherwise, the skin is intact to both lower extremities without significant lesions, rash or abrasion.           Vascular: DP & PT pulses are intact & regular on the left and right.   CFT and skin temperature is normal to the left and right lower extremities.     Neurologic: Lower extremity sensation is intact to light touch.  No evidence of weakness in the left and right lower extremities.        Musculoskeletal: Patient is ambulatory without assistive device or brace.  No gross ankle deformity noted.  No foot or ankle joint effusion is noted.         ASSESSMENT / PLAN:     ICD-10-CM    1. Ingrown nail of great toe of left foot  L60.0 REMOVAL NAIL/NAIL BED, PARTIAL OR COMPLETE      2. Ingrown nail of great toe of right foot  L60.0 REMOVAL NAIL/NAIL BED, PARTIAL OR COMPLETE    "       Plan:  I have explained to Abhay about the condition.  The potential causes and nature of an ingrown toenail were discussed with the patient.  We reviewed the natural history and prognosis of the condition and potential risks if no treatment is provided.  Treatment options discussed included conservative management (oral antibiotics, soaking of foot, adequate width shoes)  as well as surgical management (partial or total nail removal).  The pros and cons of both forms as well as risks and benefits of treatment were reviewed.       At this point, I recommended having the offending nail border permanently removed from the left and right great toe.  I have explained to the patient all of the possible risks, benefits, alternatives to the procedure.  The patient consented to the proposed procedure.  The left and right hallux was swabbed with alcohol.  Next, approximately 3 cc of 1% lidocaine plain was injected around the left and right hallux.  The left and right hallux was then prepped with Betadine ointment.  A tourniquet was applied to the left and right hallux.  A [place] elevator was utilized to free up the eponychium of the offending nail border.  Next, the offending nail border was split using an English anvil back to the matrix.  Next, utilizing a straight hemostat, the offending nail border was avulsed in toto.  The wound bed was debrided on any remaining nail and hyperkeratotic skin.  Next, the offending nail matrix was treated with 89% phenol using microtip cotton applicators for 30 seconds.  The wound was then irrigated and dressed with bacitracin antibiotic ointment and a compressive  sterile dressing.  The tourniquet was removed and a prompt hyperemic response was noted.  The patient tolerated the procedure well with no complications.  The patient was given post procedure instructions for the care of the wound.  The patient was informed that it is common to experience redness with watery drainage coming  from the treated areas of the toe related to the phenol application.  The patient may return for reevaluation and was instructed to notify the office if any redness extending past the big toe joint or fever with chills are experienced before then.      There is low risk of morbidity with the procedure.  There was no overlap in work associated with the evaluation/management and the work associated with the procedure.    Armandobarrington verbalized agreement with and understanding of the rational for the diagnosis and treatment plan.  All questions were answered to best of my ability and the patient's satisfaction. The patient was advised to contact the clinic with any questions that may arise after the clinic visit.      Disclaimer: This note consists of symbols derived from keyboarding, dictation and/or voice recognition software. As a result, there may be errors in the script that have gone undetected. Please consider this when interpreting information found in this chart.      SUSAN Duncan D.P.M., F.A.C.F.A.S.      Again, thank you for allowing me to participate in the care of your patient.        Sincerely,        Alistair Duncan DPM

## 2025-04-16 ENCOUNTER — ALLIED HEALTH/NURSE VISIT (OUTPATIENT)
Dept: FAMILY MEDICINE | Facility: CLINIC | Age: 40
End: 2025-04-16
Payer: COMMERCIAL

## 2025-04-16 DIAGNOSIS — Z11.1 SCREENING EXAMINATION FOR PULMONARY TUBERCULOSIS: Primary | ICD-10-CM

## 2025-04-16 PROCEDURE — 86580 TB INTRADERMAL TEST: CPT

## 2025-04-16 PROCEDURE — 99207 PR NO CHARGE NURSE ONLY: CPT

## 2025-04-16 NOTE — PROGRESS NOTES
"Patient is here today for a Mantoux (TST) test placement.    Is there a current order in the chart? No. Placed order according to standing order (reference the \"Skin Test- Tuberculosis Screening- Ambulatory Care\" standing order in Policy Tech). Review the Inclusion and Exclusion Criteria.        Inclusion Criteria  Admission to a nursing home or boarding care home - Administer two-step TST. Patient to return for second test in 1-3 weeks after first test is read.     Exclusion Criteria  None - Place order for Mantoux (TST) test per standing order.    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 Yes- completed per clinic's forms process.    Instructed patient to wait for 15 minutes post injection and to report any reactions immediately to staff.    Told patient to return to clinic in 48-72 hours to have Mantoux (TST) read.     Niurka OLSON          "

## 2025-04-18 ENCOUNTER — ANCILLARY PROCEDURE (OUTPATIENT)
Dept: GENERAL RADIOLOGY | Facility: CLINIC | Age: 40
End: 2025-04-18
Attending: NURSE PRACTITIONER
Payer: COMMERCIAL

## 2025-04-18 ENCOUNTER — OFFICE VISIT (OUTPATIENT)
Dept: FAMILY MEDICINE | Facility: CLINIC | Age: 40
End: 2025-04-18
Payer: COMMERCIAL

## 2025-04-18 VITALS
OXYGEN SATURATION: 98 % | DIASTOLIC BLOOD PRESSURE: 88 MMHG | RESPIRATION RATE: 20 BRPM | SYSTOLIC BLOOD PRESSURE: 136 MMHG | HEART RATE: 81 BPM | HEIGHT: 66 IN | WEIGHT: 176.4 LBS | BODY MASS INDEX: 28.35 KG/M2 | TEMPERATURE: 96.7 F

## 2025-04-18 DIAGNOSIS — R76.11 POSITIVE PPD: ICD-10-CM

## 2025-04-18 DIAGNOSIS — R76.11 POSITIVE PPD: Primary | ICD-10-CM

## 2025-04-18 PROCEDURE — 1126F AMNT PAIN NOTED NONE PRSNT: CPT | Performed by: NURSE PRACTITIONER

## 2025-04-18 PROCEDURE — 99213 OFFICE O/P EST LOW 20 MIN: CPT | Performed by: NURSE PRACTITIONER

## 2025-04-18 PROCEDURE — 3075F SYST BP GE 130 - 139MM HG: CPT | Performed by: NURSE PRACTITIONER

## 2025-04-18 PROCEDURE — 36415 COLL VENOUS BLD VENIPUNCTURE: CPT | Performed by: NURSE PRACTITIONER

## 2025-04-18 PROCEDURE — 3079F DIAST BP 80-89 MM HG: CPT | Performed by: NURSE PRACTITIONER

## 2025-04-18 PROCEDURE — 86481 TB AG RESPONSE T-CELL SUSP: CPT | Performed by: NURSE PRACTITIONER

## 2025-04-18 PROCEDURE — 71046 X-RAY EXAM CHEST 2 VIEWS: CPT | Mod: TC | Performed by: RADIOLOGY

## 2025-04-18 ASSESSMENT — PAIN SCALES - GENERAL: PAINLEVEL_OUTOF10: NO PAIN (0)

## 2025-04-18 NOTE — PROGRESS NOTES
"  Assessment & Plan     Positive PPD  -We discussed possible causes of positive PPD results including false positive, latent TB infection, active TB infection, prior vaccination with BCG vaccine. Patient was born in [place], he is unsure if he has ever received BCG vaccine. Chest x-ray today to rule-out active TB. TB gold to rule out LTBI. Discussed with patient that if TB gold is positive this could mean he has latent TB infection, meaning the TB bacteria are in his body but he is currently not contagious, however there is the potential for the bacteria to make him sick later. He is interested in potential treatment for LTBI if positive. Patient was instructed to avoid PPD/Mantoux skin testing in the future due to risk of worsening skin reactions with repeat exposure. Patient verbalized understanding of and is in agreement with plan of care. Denies any additional questions at this time.     - XR Chest 2 Views; Future  - Quantiferon TB Gold Plus; Future  - Quantiferon TB Gold Plus          BMI  Estimated body mass index is 28.47 kg/m  as calculated from the following:    Height as of this encounter: 1.676 m (5' 6\").    Weight as of this encounter: 80 kg (176 lb 6.4 oz).             Diane Blank is a 39 year old, presenting for the following health issues:  History of Present Illness        4/18/2025     4:08 PM   Additional Questions   Roomed by Niurka OLSON   Accompanied by self     Patient had PPD placed for pre-employment in healthcare. Was born in [place]. Denies cough, weight loss, night sweats, fevers, or coughing up blood. No known exposure to anyone with tuberculosis. Does not take any medications. No history of HIV or immunosuppression. He denies any known exposure to tuberculosis or history of tuberculosis infection or treatment. Induration of 12 mm.     [place]. He is unsure if he has had a BCG vaccine.           History of Present Illness       Reason for visit:  Positive " "mantoux test                       Objective    /88   Pulse 81   Temp (!) 96.7  F (35.9  C) (Tympanic)   Resp 20   Ht 1.676 m (5' 6\")   Wt 80 kg (176 lb 6.4 oz)   SpO2 98%   BMI 28.47 kg/m    Body mass index is 28.47 kg/m .  Physical Exam  Constitutional:       Appearance: Normal appearance. He is not ill-appearing.   HENT:      Right Ear: External ear normal.      Left Ear: External ear normal.      Nose: Nose normal.   Cardiovascular:      Rate and Rhythm: Normal rate and regular rhythm.      Pulses: Normal pulses.      Heart sounds: Normal heart sounds. No murmur heard.     No friction rub. No gallop.   Pulmonary:      Effort: Pulmonary effort is normal.      Breath sounds: Normal breath sounds. No wheezing, rhonchi or rales.   Musculoskeletal:      Cervical back: Normal range of motion and neck supple.   Skin:     General: Skin is warm and dry.   Neurological:      General: No focal deficit present.      Mental Status: He is alert and oriented to person, place, and time.   Psychiatric:         Mood and Affect: Mood normal.         Behavior: Behavior normal.         Thought Content: Thought content normal.         Judgment: Judgment normal.            Results for orders placed or performed in visit on 04/18/25   XR Chest 2 Views     Status: None    Narrative    EXAM: XR CHEST 2 VIEWS  LOCATION: [facility]  DATE/TIME: 4/18/2025 4:39 PM CDT    INDICATION: Positive PPD asymptomatic.  COMPARISON: None available.    FINDINGS: PA and lateral views of the chest were obtained. Cardiomediastinal silhouette is within normal limits. Mild basilar pulmonary opacities, likely atelectasis. No significant pleural effusion or pneumothorax.       Impression    IMPRESSION:  1.  No evidence of active pulmonary tuberculosis.   Results for orders placed or performed in visit on 04/18/25   Quantiferon TB Gold Plus     Status: None (In process)    Specimen: Peripheral Blood    Narrative    The " following orders were created for panel order Quantiferon TB Gold Plus.  Procedure                               Abnormality         Status                     ---------                               -----------         ------                     Quantiferon TB Gold Plu...[[id number]]                      In process                 Quantiferon TB Gold Plu...[[id number]]                      In process                 Quantiferon TB Gold Plu...[[id number]]                      In process                 Quantiferon TB Gold Plu...[[id number]]                      In process                   Please view results for these tests on the individual orders.             Signed Electronically by: SHIVA Silver CNP

## 2025-04-18 NOTE — LETTER
April 23, 2025      Abhay MALHOTRA Jessica  [id number] [facility] [id number]          Eric Blank,   Your blood test was negative for tuberculosis infection. That means your skin test was either a false positive or you could have received the BCG vaccine in [place]. In the future, you should only be tested for tuberculosis with the blood test, not the skin test, as the skin reaction can worsen with repeat exposure. Please let me know if any questions.     Resulted Orders   Quantiferon TB Gold Plus   Result Value Ref Range    Quantiferon-TB Gold Plus Negative Negative      Comment:      No interferon gamma response to M.tuberculosis antigens was detected. Infection with M.tuberculosis is unlikely, however a single negative result does not exclude infection. In patients at high risk for infection, a second test should be considered in accordance with the 2017 ATS/IDSA/CDC Clinical Pract  ice Guidelines for Diagnosis of Tuberculosis in Adults and Children     TB1 Ag minus Nil Value 0.01 IU/mL    TB2 Ag minus Nil Value 0.00 IU/mL    Mitogen minus Nil Result 9.92 IU/mL    Nil Result 0.08 IU/mL       If you have any questions or concerns, please call the clinic at the number listed above.       Sincerely,      SHIVA Silver CNP/KP    Electronically signed

## 2025-04-20 LAB
GAMMA INTERFERON BACKGROUND BLD IA-ACNC: 0.08 IU/ML
M TB IFN-G BLD-IMP: NEGATIVE
M TB IFN-G CD4+ BCKGRND COR BLD-ACNC: 9.92 IU/ML
MITOGEN IGNF BCKGRD COR BLD-ACNC: 0 IU/ML
MITOGEN IGNF BCKGRD COR BLD-ACNC: 0.01 IU/ML
QUANTIFERON MITOGEN: 10 IU/ML
QUANTIFERON NIL TUBE: 0.08 IU/ML
QUANTIFERON TB1 TUBE: 0.09 IU/ML
QUANTIFERON TB2 TUBE: 0.08

## 2025-05-01 ENCOUNTER — ALLIED HEALTH/NURSE VISIT (OUTPATIENT)
Dept: FAMILY MEDICINE | Facility: CLINIC | Age: 40
End: 2025-05-01
Payer: COMMERCIAL

## 2025-05-01 DIAGNOSIS — Z23 NEED FOR CHICKENPOX VACCINATION: Primary | ICD-10-CM
